# Patient Record
Sex: MALE | Race: WHITE | NOT HISPANIC OR LATINO
[De-identification: names, ages, dates, MRNs, and addresses within clinical notes are randomized per-mention and may not be internally consistent; named-entity substitution may affect disease eponyms.]

---

## 2017-08-20 ENCOUNTER — TRANSCRIPTION ENCOUNTER (OUTPATIENT)
Age: 47
End: 2017-08-20

## 2018-03-02 ENCOUNTER — MOBILE ON CALL (OUTPATIENT)
Age: 48
End: 2018-03-02

## 2018-03-13 ENCOUNTER — OUTPATIENT (OUTPATIENT)
Dept: OUTPATIENT SERVICES | Facility: HOSPITAL | Age: 48
LOS: 1 days | Discharge: HOME | End: 2018-03-13

## 2018-03-13 DIAGNOSIS — C61 MALIGNANT NEOPLASM OF PROSTATE: ICD-10-CM

## 2018-03-15 ENCOUNTER — APPOINTMENT (OUTPATIENT)
Dept: HEMATOLOGY ONCOLOGY | Facility: CLINIC | Age: 48
End: 2018-03-15

## 2018-07-12 ENCOUNTER — EMERGENCY (EMERGENCY)
Facility: HOSPITAL | Age: 48
LOS: 1 days | Discharge: HOME | End: 2018-07-12
Attending: EMERGENCY MEDICINE
Payer: COMMERCIAL

## 2018-07-12 VITALS — HEIGHT: 71 IN | WEIGHT: 289.91 LBS

## 2018-07-12 LAB
ALBUMIN SERPL ELPH-MCNC: 4.1 G/DL — SIGNIFICANT CHANGE UP (ref 3.3–5.2)
ALP SERPL-CCNC: 62 U/L — SIGNIFICANT CHANGE UP (ref 40–120)
ALT FLD-CCNC: 32 U/L — SIGNIFICANT CHANGE UP
ANION GAP SERPL CALC-SCNC: 13 MMOL/L — SIGNIFICANT CHANGE UP (ref 5–17)
AST SERPL-CCNC: 18 U/L — SIGNIFICANT CHANGE UP
BASOPHILS # BLD AUTO: 0 K/UL — SIGNIFICANT CHANGE UP (ref 0–0.2)
BASOPHILS NFR BLD AUTO: 0.3 % — SIGNIFICANT CHANGE UP (ref 0–2)
BILIRUB SERPL-MCNC: 0.3 MG/DL — LOW (ref 0.4–2)
BUN SERPL-MCNC: 23 MG/DL — HIGH (ref 8–20)
CALCIUM SERPL-MCNC: 9 MG/DL — SIGNIFICANT CHANGE UP (ref 8.6–10.2)
CHLORIDE SERPL-SCNC: 100 MMOL/L — SIGNIFICANT CHANGE UP (ref 98–107)
CO2 SERPL-SCNC: 24 MMOL/L — SIGNIFICANT CHANGE UP (ref 22–29)
CREAT SERPL-MCNC: 0.78 MG/DL — SIGNIFICANT CHANGE UP (ref 0.5–1.3)
EOSINOPHIL # BLD AUTO: 0.2 K/UL — SIGNIFICANT CHANGE UP (ref 0–0.5)
EOSINOPHIL NFR BLD AUTO: 2.5 % — SIGNIFICANT CHANGE UP (ref 0–6)
GLUCOSE SERPL-MCNC: 106 MG/DL — SIGNIFICANT CHANGE UP (ref 70–115)
HCT VFR BLD CALC: 39 % — LOW (ref 42–52)
HGB BLD-MCNC: 13.2 G/DL — LOW (ref 14–18)
INR BLD: 1.04 RATIO — SIGNIFICANT CHANGE UP (ref 0.88–1.16)
LYMPHOCYTES # BLD AUTO: 1.3 K/UL — SIGNIFICANT CHANGE UP (ref 1–4.8)
LYMPHOCYTES # BLD AUTO: 20.8 % — SIGNIFICANT CHANGE UP (ref 20–55)
MCHC RBC-ENTMCNC: 29.8 PG — SIGNIFICANT CHANGE UP (ref 27–31)
MCHC RBC-ENTMCNC: 33.8 G/DL — SIGNIFICANT CHANGE UP (ref 32–36)
MCV RBC AUTO: 88 FL — SIGNIFICANT CHANGE UP (ref 80–94)
MONOCYTES # BLD AUTO: 0.7 K/UL — SIGNIFICANT CHANGE UP (ref 0–0.8)
MONOCYTES NFR BLD AUTO: 11.6 % — HIGH (ref 3–10)
NEUTROPHILS # BLD AUTO: 4.1 K/UL — SIGNIFICANT CHANGE UP (ref 1.8–8)
NEUTROPHILS NFR BLD AUTO: 64.3 % — SIGNIFICANT CHANGE UP (ref 37–73)
NT-PROBNP SERPL-SCNC: 50 PG/ML — SIGNIFICANT CHANGE UP (ref 0–300)
PLATELET # BLD AUTO: 170 K/UL — SIGNIFICANT CHANGE UP (ref 150–400)
POTASSIUM SERPL-MCNC: 4 MMOL/L — SIGNIFICANT CHANGE UP (ref 3.5–5.3)
POTASSIUM SERPL-SCNC: 4 MMOL/L — SIGNIFICANT CHANGE UP (ref 3.5–5.3)
PROT SERPL-MCNC: 7 G/DL — SIGNIFICANT CHANGE UP (ref 6.6–8.7)
PROTHROM AB SERPL-ACNC: 11.4 SEC — SIGNIFICANT CHANGE UP (ref 9.8–12.7)
RBC # BLD: 4.43 M/UL — LOW (ref 4.6–6.2)
RBC # FLD: 13.9 % — SIGNIFICANT CHANGE UP (ref 11–15.6)
SODIUM SERPL-SCNC: 137 MMOL/L — SIGNIFICANT CHANGE UP (ref 135–145)
TROPONIN T SERPL-MCNC: <0.01 NG/ML — SIGNIFICANT CHANGE UP (ref 0–0.06)
TROPONIN T SERPL-MCNC: <0.01 NG/ML — SIGNIFICANT CHANGE UP (ref 0–0.06)
WBC # BLD: 6.4 K/UL — SIGNIFICANT CHANGE UP (ref 4.8–10.8)
WBC # FLD AUTO: 6.4 K/UL — SIGNIFICANT CHANGE UP (ref 4.8–10.8)

## 2018-07-12 PROCEDURE — 93010 ELECTROCARDIOGRAM REPORT: CPT

## 2018-07-12 PROCEDURE — 71046 X-RAY EXAM CHEST 2 VIEWS: CPT | Mod: 26

## 2018-07-12 PROCEDURE — 99220: CPT

## 2018-07-12 PROCEDURE — 71275 CT ANGIOGRAPHY CHEST: CPT | Mod: 26

## 2018-07-12 PROCEDURE — 99285 EMERGENCY DEPT VISIT HI MDM: CPT

## 2018-07-12 RX ORDER — SODIUM CHLORIDE 9 MG/ML
1000 INJECTION INTRAMUSCULAR; INTRAVENOUS; SUBCUTANEOUS ONCE
Qty: 0 | Refills: 0 | Status: COMPLETED | OUTPATIENT
Start: 2018-07-12 | End: 2018-07-12

## 2018-07-12 RX ORDER — ASPIRIN/CALCIUM CARB/MAGNESIUM 324 MG
325 TABLET ORAL ONCE
Qty: 0 | Refills: 0 | Status: COMPLETED | OUTPATIENT
Start: 2018-07-12 | End: 2018-07-12

## 2018-07-12 RX ORDER — METOPROLOL TARTRATE 50 MG
50 TABLET ORAL ONCE
Qty: 0 | Refills: 0 | Status: COMPLETED | OUTPATIENT
Start: 2018-07-12 | End: 2018-07-12

## 2018-07-12 RX ADMIN — SODIUM CHLORIDE 1000 MILLILITER(S): 9 INJECTION INTRAMUSCULAR; INTRAVENOUS; SUBCUTANEOUS at 13:50

## 2018-07-12 RX ADMIN — Medication 50 MILLIGRAM(S): at 15:55

## 2018-07-12 RX ADMIN — Medication 325 MILLIGRAM(S): at 13:50

## 2018-07-12 NOTE — CONSULT NOTE ADULT - ASSESSMENT
48 M smoker with hx metastatic prostate Ca presented with c/o CP started last night  around 6 pm continued throughout the night, CP is across the check, felt like tightness has been going on until today in the morning although now resolved, associated with SOB, denied modifying factors, in context of non exertion, moderate intensity, non radiating. Denied fever, chills, nausea, vomiting, hematuria,  melena, syncope, near syncope.       A/P  CP  EKG Normal  Trop #1 neg  AGree with CTA of Chest  Serial CE  Repeat EKG  ASA  NST   d/w pt. He understood well.  d/w ER team.

## 2018-07-12 NOTE — ED PROVIDER NOTE - PROGRESS NOTE DETAILS
discussed with cardio Dr Card for consult. Pt seen by Cardio Dr Card, wants patient admitted to obs for stress test in the morning.  discussed all results and CT scan with with patient and wife, understands and agrees with plan.

## 2018-07-12 NOTE — ED ADULT NURSE REASSESSMENT NOTE - NS ED NURSE REASSESS COMMENT FT1
resting comfortably, nsr on monitor, deciding weather he is willing to stay for stress in am, discussing with family, importance of test stressed to pt and family

## 2018-07-12 NOTE — CONSULT NOTE ADULT - SUBJECTIVE AND OBJECTIVE BOX
CARDIOLOGY CONSULTATION NOTE   Consult requested by:      Reason for Consultation:     History obtained by: Patient, family and medical record     obtained: No    Chief complaint:    Patient is a 48y old  Male who presents with a chief complaint of     HPI:  48 M smoker with hx metastatic prostate Ca presented with c/o CP started last night  around 6 pm continued throughout the night, CP is across the check, felt like tightness has been going on until today in the morning although now resolved, associated with SOB, denied modifying factors, in context of non exertion, moderate intensity, non radiating. Denied fever, chills, nausea, vomiting, hematuria,  melena, syncope, near syncope.       REVIEW OF SYMPTOMS:   Constitutional: Denied: fever, chills, weight loss or gain  Eyes: Denied: reddened ayes, eye discharge, eye pain  ENMT: Denied: ear pain, nasal discharge, mouth pain, throat pain or swelling  Cardiovascular: Denied:  palpitation, arrhythmia, irregular or fast heart beats, edema  Respiratory: Denied: cough, phlegm production, wheezes, orthopnea, PND,   GI: Denied: Abdominal pain, nausea, vomiting, diarrhea, constipation, melena, rectal bleed  : Denied: hematuria, frequency  Musculoskeletal: Denied: Muscle aches, weakness, pain  Hematology/lymp: Denied: Bleeding disorder, anemia, blood clotting  Neuro: Denied: Headache, light headedness, dizziness, numbness, aphasia, dysarthria, seizure,  syncope, near syncope  Psych: Denied: depression, anxiety  Integumentary/skin: Denied: rash, bruises, ecchymosis, itching  Allergy/Immunology: denied environmental or drug allergies    ALL OTHER REVIEW OF SYSTEMS ARE NEGATIVE.    MEDICATIONS  (STANDING):    MEDICATIONS  (PRN):        PAST MEDICAL & SURGICAL HISTORY:  Prostate cancer: in process of eval for mets to lungs and liver  Adenocarcinoma  No significant past surgical history      FAMILY HISTORY:  No pertinent family history in first degree relatives      SOCIAL HISTORY:    CIGARETTES:  No    ALCOHOL: No    DRUGS: No    Vital Signs Last 24 Hrs  T(C): 37 (12 Jul 2018 12:12), Max: 37 (12 Jul 2018 12:12)  T(F): 98.6 (12 Jul 2018 12:12), Max: 98.6 (12 Jul 2018 12:12)  HR: 68 (12 Jul 2018 15:56) (66 - 68)  BP: 142/82 (12 Jul 2018 15:56) (142/79 - 142/82)  BP(mean): --  RR: 20 (12 Jul 2018 15:56) (18 - 20)  SpO2: 97% (12 Jul 2018 15:56) (96% - 97%)    PHYSICAL EXAM:      Constitutional: No fever, chills, NAD, Comfortable    Eyes: Not reddened, no discharge    ENMT: No discharge, No pain    Neck: No JVD, No Bruit    Back: No CVA tenderness    Respiratory: Clear to auscultation bilaterally    Cardiovascular: RRR, Normal S1-2, No S3-, No friction rub murmur    Gastrointestinal: Soft, NT/ND. BS+, No organomegaly    Extremities: No edema    Vascular: Distal pulses intact    Neurological: Alert, awake, oriented, able to move extremities, speach is clear    Skin: No ecchymosis, no rash    Musculoskeletal: Non tender, no weaknss    Psychiatric: Aproppriate mood, no anxiety        INTERPRETATION OF TELEMETRY:    ECG: NSR, Normal EKG    I&O's Detail      LABS:                        13.2   6.4   )-----------( 170      ( 12 Jul 2018 13:44 )             39.0     07-12    137  |  100  |  23.0<H>  ----------------------------<  106  4.0   |  24.0  |  0.78    Ca    9.0      12 Jul 2018 13:44    TPro  7.0  /  Alb  4.1  /  TBili  0.3<L>  /  DBili  x   /  AST  18  /  ALT  32  /  AlkPhos  62  07-12    CARDIAC MARKERS ( 12 Jul 2018 13:44 )  x     / <0.01 ng/mL / x     / x     / x          PT/INR - ( 12 Jul 2018 13:44 )   PT: 11.4 sec;   INR: 1.04 ratio             I

## 2018-07-12 NOTE — ED PROVIDER NOTE - OBJECTIVE STATEMENT
Patient started with Chest pain last night around 6pm, chest pain continued throughout the night, is very faint now.  no fever, no nausea, vomiting, positive sweating, no radiation of pain.  felt as a tightness and shortness of breath, no similar events. no trauma. patient stopped smoking in March 2018. patient was diagnosed with Prostate cancer stage IV in march 2018.  currently on no chemo or radiation but is on hormone therapy. received a infusion yesterday for thin bones and thinks it may be the cause of the symptoms. he received that in the morning yesterday.  no hx of stents, MI, PE or clots. no recent illnesses.  norhting made it better or worse. patient did not take any aspirin.

## 2018-07-12 NOTE — ED CDU PROVIDER INITIAL DAY NOTE - OBJECTIVE STATEMENT
Patient with Chest pain since last night, negative CT angio for PE, no hx of cardiac eval, recently quit smoking, positive active metastatic lung Ca.

## 2018-07-12 NOTE — ED PROVIDER NOTE - MEDICAL DECISION MAKING DETAILS
Patient with negative troponin and EKG, negative ct angio, seen by Cardio, wants admit to observation to have stress test in the morning.

## 2018-07-13 VITALS
DIASTOLIC BLOOD PRESSURE: 68 MMHG | HEART RATE: 71 BPM | RESPIRATION RATE: 16 BRPM | TEMPERATURE: 98 F | OXYGEN SATURATION: 99 % | SYSTOLIC BLOOD PRESSURE: 145 MMHG

## 2018-07-13 PROCEDURE — 78452 HT MUSCLE IMAGE SPECT MULT: CPT

## 2018-07-13 PROCEDURE — 84484 ASSAY OF TROPONIN QUANT: CPT

## 2018-07-13 PROCEDURE — 93018 CV STRESS TEST I&R ONLY: CPT

## 2018-07-13 PROCEDURE — 85027 COMPLETE CBC AUTOMATED: CPT

## 2018-07-13 PROCEDURE — 83880 ASSAY OF NATRIURETIC PEPTIDE: CPT

## 2018-07-13 PROCEDURE — 99284 EMERGENCY DEPT VISIT MOD MDM: CPT | Mod: 25

## 2018-07-13 PROCEDURE — 71046 X-RAY EXAM CHEST 2 VIEWS: CPT

## 2018-07-13 PROCEDURE — 93005 ELECTROCARDIOGRAM TRACING: CPT

## 2018-07-13 PROCEDURE — 93017 CV STRESS TEST TRACING ONLY: CPT

## 2018-07-13 PROCEDURE — 78452 HT MUSCLE IMAGE SPECT MULT: CPT | Mod: 26

## 2018-07-13 PROCEDURE — 99217: CPT

## 2018-07-13 PROCEDURE — 80053 COMPREHEN METABOLIC PANEL: CPT

## 2018-07-13 PROCEDURE — 36415 COLL VENOUS BLD VENIPUNCTURE: CPT

## 2018-07-13 PROCEDURE — G0378: CPT

## 2018-07-13 PROCEDURE — 93016 CV STRESS TEST SUPVJ ONLY: CPT

## 2018-07-13 PROCEDURE — A9500: CPT

## 2018-07-13 PROCEDURE — 85610 PROTHROMBIN TIME: CPT

## 2018-07-13 PROCEDURE — 71275 CT ANGIOGRAPHY CHEST: CPT

## 2018-07-13 NOTE — ED ADULT NURSE REASSESSMENT NOTE - NS ED NURSE REASSESS COMMENT FT1
Patient Back from Nuclear test. Pt Patient Back from Nuclear test. Pt A&OX3, Denies any pain or discomfort at this time. VSS.

## 2019-03-03 ENCOUNTER — EMERGENCY (EMERGENCY)
Facility: HOSPITAL | Age: 49
LOS: 1 days | Discharge: HOME | End: 2019-03-03
Attending: EMERGENCY MEDICINE
Payer: COMMERCIAL

## 2019-03-03 VITALS
TEMPERATURE: 100 F | RESPIRATION RATE: 18 BRPM | DIASTOLIC BLOOD PRESSURE: 70 MMHG | HEART RATE: 72 BPM | WEIGHT: 300.05 LBS | HEIGHT: 71 IN | SYSTOLIC BLOOD PRESSURE: 106 MMHG | OXYGEN SATURATION: 95 %

## 2019-03-03 PROBLEM — C80.1 MALIGNANT (PRIMARY) NEOPLASM, UNSPECIFIED: Chronic | Status: ACTIVE | Noted: 2018-07-12

## 2019-03-03 PROBLEM — C61 MALIGNANT NEOPLASM OF PROSTATE: Chronic | Status: ACTIVE | Noted: 2018-07-12

## 2019-03-03 LAB
ALBUMIN SERPL ELPH-MCNC: 4.2 G/DL — SIGNIFICANT CHANGE UP (ref 3.3–5.2)
ALP SERPL-CCNC: 76 U/L — SIGNIFICANT CHANGE UP (ref 40–120)
ALT FLD-CCNC: 27 U/L — SIGNIFICANT CHANGE UP
ANION GAP SERPL CALC-SCNC: 13 MMOL/L — SIGNIFICANT CHANGE UP (ref 5–17)
APPEARANCE UR: ABNORMAL
AST SERPL-CCNC: 14 U/L — SIGNIFICANT CHANGE UP
BILIRUB SERPL-MCNC: 0.4 MG/DL — SIGNIFICANT CHANGE UP (ref 0.4–2)
BILIRUB UR-MCNC: NEGATIVE — SIGNIFICANT CHANGE UP
BUN SERPL-MCNC: 22 MG/DL — HIGH (ref 8–20)
CALCIUM SERPL-MCNC: 9.2 MG/DL — SIGNIFICANT CHANGE UP (ref 8.6–10.2)
CHLORIDE SERPL-SCNC: 102 MMOL/L — SIGNIFICANT CHANGE UP (ref 98–107)
CO2 SERPL-SCNC: 20 MMOL/L — LOW (ref 22–29)
COLOR SPEC: YELLOW — SIGNIFICANT CHANGE UP
CREAT SERPL-MCNC: 0.64 MG/DL — SIGNIFICANT CHANGE UP (ref 0.5–1.3)
DIFF PNL FLD: ABNORMAL
EPI CELLS # UR: SIGNIFICANT CHANGE UP
GLUCOSE SERPL-MCNC: 102 MG/DL — SIGNIFICANT CHANGE UP (ref 70–115)
GLUCOSE UR QL: NEGATIVE MG/DL — SIGNIFICANT CHANGE UP
HCT VFR BLD CALC: 39.5 % — LOW (ref 42–52)
HGB BLD-MCNC: 13.5 G/DL — LOW (ref 14–18)
KETONES UR-MCNC: ABNORMAL
LACTATE BLDV-MCNC: 1 MMOL/L — SIGNIFICANT CHANGE UP (ref 0.5–2)
LEUKOCYTE ESTERASE UR-ACNC: ABNORMAL
LIDOCAIN IGE QN: 23 U/L — SIGNIFICANT CHANGE UP (ref 22–51)
LYMPHOCYTES # BLD AUTO: 5 % — LOW (ref 20–55)
MCHC RBC-ENTMCNC: 29.3 PG — SIGNIFICANT CHANGE UP (ref 27–31)
MCHC RBC-ENTMCNC: 34.2 G/DL — SIGNIFICANT CHANGE UP (ref 32–36)
MCV RBC AUTO: 85.9 FL — SIGNIFICANT CHANGE UP (ref 80–94)
MONOCYTES NFR BLD AUTO: 3 % — SIGNIFICANT CHANGE UP (ref 3–10)
NEUTROPHILS NFR BLD AUTO: 92 % — HIGH (ref 37–73)
NITRITE UR-MCNC: NEGATIVE — SIGNIFICANT CHANGE UP
PH UR: 5 — SIGNIFICANT CHANGE UP (ref 5–8)
PLAT MORPH BLD: NORMAL — SIGNIFICANT CHANGE UP
PLATELET # BLD AUTO: 132 K/UL — LOW (ref 150–400)
POTASSIUM SERPL-MCNC: 4.2 MMOL/L — SIGNIFICANT CHANGE UP (ref 3.5–5.3)
POTASSIUM SERPL-SCNC: 4.2 MMOL/L — SIGNIFICANT CHANGE UP (ref 3.5–5.3)
PROT SERPL-MCNC: 7.2 G/DL — SIGNIFICANT CHANGE UP (ref 6.6–8.7)
PROT UR-MCNC: 30 MG/DL
RAPID RVP RESULT: SIGNIFICANT CHANGE UP
RBC # BLD: 4.6 M/UL — SIGNIFICANT CHANGE UP (ref 4.6–6.2)
RBC # FLD: 14.1 % — SIGNIFICANT CHANGE UP (ref 11–15.6)
RBC BLD AUTO: NORMAL — SIGNIFICANT CHANGE UP
RBC CASTS # UR COMP ASSIST: ABNORMAL /HPF (ref 0–4)
SODIUM SERPL-SCNC: 135 MMOL/L — SIGNIFICANT CHANGE UP (ref 135–145)
SP GR SPEC: 1.02 — SIGNIFICANT CHANGE UP (ref 1.01–1.02)
TROPONIN T SERPL-MCNC: <0.01 NG/ML — SIGNIFICANT CHANGE UP (ref 0–0.06)
UROBILINOGEN FLD QL: 1 MG/DL
WBC # BLD: 6 K/UL — SIGNIFICANT CHANGE UP (ref 4.8–10.8)
WBC # FLD AUTO: 6 K/UL — SIGNIFICANT CHANGE UP (ref 4.8–10.8)
WBC UR QL: SIGNIFICANT CHANGE UP

## 2019-03-03 PROCEDURE — 93010 ELECTROCARDIOGRAM REPORT: CPT

## 2019-03-03 PROCEDURE — 99218: CPT

## 2019-03-03 PROCEDURE — 73010 X-RAY EXAM OF SHOULDER BLADE: CPT | Mod: 26

## 2019-03-03 PROCEDURE — 71046 X-RAY EXAM CHEST 2 VIEWS: CPT | Mod: 26

## 2019-03-03 RX ORDER — SODIUM CHLORIDE 9 MG/ML
2000 INJECTION INTRAMUSCULAR; INTRAVENOUS; SUBCUTANEOUS
Qty: 0 | Refills: 0 | Status: DISCONTINUED | OUTPATIENT
Start: 2019-03-03 | End: 2019-03-08

## 2019-03-03 RX ORDER — AZITHROMYCIN 500 MG/1
500 TABLET, FILM COATED ORAL EVERY 24 HOURS
Qty: 0 | Refills: 0 | Status: DISCONTINUED | OUTPATIENT
Start: 2019-03-03 | End: 2019-03-08

## 2019-03-03 RX ORDER — HYDROCORTISONE 20 MG
15 TABLET ORAL
Qty: 0 | Refills: 0 | Status: DISCONTINUED | OUTPATIENT
Start: 2019-03-03 | End: 2019-03-08

## 2019-03-03 RX ORDER — ACETAMINOPHEN 500 MG
650 TABLET ORAL EVERY 6 HOURS
Qty: 0 | Refills: 0 | Status: DISCONTINUED | OUTPATIENT
Start: 2019-03-03 | End: 2019-03-08

## 2019-03-03 RX ORDER — CEFTRIAXONE 500 MG/1
1 INJECTION, POWDER, FOR SOLUTION INTRAMUSCULAR; INTRAVENOUS EVERY 24 HOURS
Qty: 0 | Refills: 0 | Status: DISCONTINUED | OUTPATIENT
Start: 2019-03-03 | End: 2019-03-08

## 2019-03-03 RX ORDER — ABIRATERONE ACETATE 250 MG/1
4 TABLET ORAL
Qty: 0 | Refills: 0 | COMMUNITY

## 2019-03-03 RX ORDER — PREDNISOLONE 5 MG
1 TABLET ORAL
Qty: 0 | Refills: 0 | COMMUNITY

## 2019-03-03 RX ADMIN — Medication 650 MILLIGRAM(S): at 22:11

## 2019-03-03 RX ADMIN — AZITHROMYCIN 500 MILLIGRAM(S): 500 TABLET, FILM COATED ORAL at 18:48

## 2019-03-03 RX ADMIN — SODIUM CHLORIDE 1000 MILLILITER(S): 9 INJECTION INTRAMUSCULAR; INTRAVENOUS; SUBCUTANEOUS at 15:50

## 2019-03-03 RX ADMIN — Medication 10 MILLIGRAM(S): at 18:49

## 2019-03-03 RX ADMIN — CEFTRIAXONE 100 GRAM(S): 500 INJECTION, POWDER, FOR SOLUTION INTRAMUSCULAR; INTRAVENOUS at 18:49

## 2019-03-03 RX ADMIN — Medication 650 MILLIGRAM(S): at 20:36

## 2019-03-03 RX ADMIN — SODIUM CHLORIDE 2000 MILLILITER(S): 9 INJECTION INTRAMUSCULAR; INTRAVENOUS; SUBCUTANEOUS at 18:59

## 2019-03-03 RX ADMIN — CEFTRIAXONE 1 GRAM(S): 500 INJECTION, POWDER, FOR SOLUTION INTRAMUSCULAR; INTRAVENOUS at 19:34

## 2019-03-03 RX ADMIN — Medication 15 MILLIGRAM(S): at 22:08

## 2019-03-03 NOTE — ED STATDOCS - ATTENDING CONTRIBUTION TO CARE
I, Aman Hines, performed the initial face to face bedside interview with this patient regarding history of present illness, review of symptoms and relevant past medical, social and family history.  I completed an independent physical examination.  I was the initial provider who evaluated this patient. I have signed out the follow up of any pending tests (i.e. labs, radiological studies) to the ACP.  I have communicated the patient’s plan of care and disposition with the ACP.  The history, relevant review of systems, past medical and surgical history, medical decision making, and physical examination was documented by the scribe in my presence and I attest to the accuracy of the documentation.

## 2019-03-03 NOTE — ED ADULT NURSE NOTE - CAS EDN DISCHARGE ASSESSMENT
Alert and oriented to person, place and time/Patient baseline mental status/Awake Symptoms improved/Alert and oriented to person, place and time/Patient baseline mental status/Awake

## 2019-03-03 NOTE — ED ADULT NURSE NOTE - NSSISCREENINGQ5_ED_A_ED
Discharge Information    IV Discontiued Time:  NA    Amount of Fluid Infused:  NA    Discharge Criteria = When patient returns to baseline or as per MD order    Consciousness:  Pt is fully awake    Circulation:  BP +/- 20% of pre-procedure level    Respiration:  Patient is able to breathe deeply    O2 Sat:  Patient is able to maintain O2 Sat >92% on room air    Activity:  Moves 4 extremities on command    Ambulation:  Patient is able to stand and walk or stand and pivot into wheelchair    Dressing:  Clean/dry or No Dressing    Notes:   Discharge instructions and AVS given to patient    Patient meets criteria for discharge?  YES    Admitted to PCU?  No    Responsible adult present to accompany patient home?  Yes    Signature/Title:    Leti Carrillo RN Care Coordinator  [facility] [place]   No

## 2019-03-03 NOTE — ED STATDOCS - OBJECTIVE STATEMENT
49 y/o M pt with PMHx of Adenocarcinoma and Prostate cancer presents to the ED c/o fever onset this morning. Reports fever, nausea, vomiting, diarrhea, body aches, and lethargy. Pt also reported L shoulder/upper back pain and JESSICA leg and hand pain onset a few days. He spoke to his doctor who sent him to the ED for further evaluation. Pt is currently receiving radiation treatment for his Prostate CA and is on his third week of treatment. He took Tylenol at 11am this morning which provided minimal to no relief. Pt is able to ambulate with no difficulties. Denies chills, cough, or rhinorrhea. No further complaints at this time.

## 2019-03-03 NOTE — ED CDU PROVIDER INITIAL DAY NOTE - OBJECTIVE STATEMENT
47 y/o male h/o prostate cancer undergoing radiation therapy presents c/o body aches, vomiting, and diarrhea. PT reports occasionally in the morning however denies any productive cough. 49 y/o male h/o prostate cancer undergoing radiation therapy presents c/o body aches, vomiting, and diarrhea. PT reports cough occasionally in the morning however denies any productive cough or URI symptoms.

## 2019-03-03 NOTE — ED CDU PROVIDER INITIAL DAY NOTE - PROGRESS NOTE DETAILS
Oncologist Dr. Oliver [phone]  current meds: prednisone 10mg PO qd, Abiraterone Acetate 1000mg PO qd Discussed with Dr. Oliver who agrees with Rocephin and Zithromax. Will add on tamiflu if flu positive.   I spoke with pharmacy and Abiraterone Acetate not available in hospital. Family will bring in home meds. Discussed with Dr. Oliver who agrees with Rocephin and Zithromax. Will add on tamiflu if flu positive.   I spoke with pharmacy and Abiraterone Acetate not available in hospital. Family will bring in home meds. Oncologist advised PT is on prednisone 5mg bid. PT seen resting comfortably in no acute distress, no acute complaints at this time, PT tolerating PO intake,  PT informed of all results, and tx plan.   ROS: PT denies fever, chills, wekaness, HA,  dizziness,  ear pian, throat pain, CP, cough,  SOB, diff breathing, Abd pain, change in urination or BM, rash or recent exposure to illness.   PE: GEN: Awake, alert,  NAD,  EYES: PERRL CARDIAC: Reg rate and rhythm, S1,S2, RRR  RESP: No distress noted. Lungs CTA bilaterally no wheeze, ronchi, rales. ABD: soft,  non-tender, no guarding. . NEURO: AOx3, no focal deficits sensation strenght intact.   PLAN: continue with IV ABX, give home chemo drug as per private Oncologist, and revaluate. PT with abnormal VS but continues to be clinically stable Oncologist Dr. Oliver contacted recommended steroid stress dosing due to pt being on crocinic steroid use. dose acquired from [facility] as 12mg/m2/daily divided in 2 doses. Will tx with steroids is VS continue to be abnormal will likely admit.

## 2019-03-03 NOTE — ED CDU PROVIDER INITIAL DAY NOTE - ATTENDING CONTRIBUTION TO CARE
Pt. stable appearing. Pt. with pneumonia. Will treat with IV ABX. I have discussed the plan with the ACP.

## 2019-03-03 NOTE — ED STATDOCS - PROGRESS NOTE DETAILS
PA Note: PT evaluated by intake physician. HPI/PE/ROS as noted above. Will follow up plan per intake physician. CXR showing left lower lobe pneumonia. Discussed with hospitalist Dr. Vega, pt does not meet admission criteria at this time as labwork is wnl, pt is not hypoxic, is not necessarily immunocompromised with radiation treatment. Pt to be admitted to observation for IV antibiotics and to monitor.

## 2019-03-03 NOTE — ED ADULT NURSE NOTE - OBJECTIVE STATEMENT
pt currently going through course of radiation, experiencing body aches, hands, shoulders, hips, legs. feeling generalized weakness. last treatment friday. denies fevers.

## 2019-03-03 NOTE — ED CDU PROVIDER INITIAL DAY NOTE - MEDICAL DECISION MAKING DETAILS
47 y/o male h/o prostate CA presents with flu like symptoms with let lower lobe pneumonia   will admit to obs for IV abx and re-eval, flu swab is pending

## 2019-03-03 NOTE — ED STATDOCS - CHPI ED SYMPTOM POS
body aches, lethargy, L shoulder/upper back pain and JESSICA leg and hand pain/FEVER/NAUSEA/DIARRHEA/VOMITING

## 2019-03-04 VITALS
RESPIRATION RATE: 16 BRPM | DIASTOLIC BLOOD PRESSURE: 79 MMHG | OXYGEN SATURATION: 96 % | HEART RATE: 58 BPM | TEMPERATURE: 98 F | SYSTOLIC BLOOD PRESSURE: 133 MMHG

## 2019-03-04 LAB
CULTURE RESULTS: NO GROWTH — SIGNIFICANT CHANGE UP
SPECIMEN SOURCE: SIGNIFICANT CHANGE UP

## 2019-03-04 PROCEDURE — 83690 ASSAY OF LIPASE: CPT

## 2019-03-04 PROCEDURE — 81001 URINALYSIS AUTO W/SCOPE: CPT

## 2019-03-04 PROCEDURE — 87486 CHLMYD PNEUM DNA AMP PROBE: CPT

## 2019-03-04 PROCEDURE — 80053 COMPREHEN METABOLIC PANEL: CPT

## 2019-03-04 PROCEDURE — 99284 EMERGENCY DEPT VISIT MOD MDM: CPT | Mod: 25

## 2019-03-04 PROCEDURE — 87186 SC STD MICRODIL/AGAR DIL: CPT

## 2019-03-04 PROCEDURE — 71046 X-RAY EXAM CHEST 2 VIEWS: CPT

## 2019-03-04 PROCEDURE — 84484 ASSAY OF TROPONIN QUANT: CPT

## 2019-03-04 PROCEDURE — 99217: CPT

## 2019-03-04 PROCEDURE — 87040 BLOOD CULTURE FOR BACTERIA: CPT

## 2019-03-04 PROCEDURE — 73010 X-RAY EXAM OF SHOULDER BLADE: CPT

## 2019-03-04 PROCEDURE — 36415 COLL VENOUS BLD VENIPUNCTURE: CPT

## 2019-03-04 PROCEDURE — 96361 HYDRATE IV INFUSION ADD-ON: CPT

## 2019-03-04 PROCEDURE — 87581 M.PNEUMON DNA AMP PROBE: CPT

## 2019-03-04 PROCEDURE — 96365 THER/PROPH/DIAG IV INF INIT: CPT

## 2019-03-04 PROCEDURE — 87633 RESP VIRUS 12-25 TARGETS: CPT

## 2019-03-04 PROCEDURE — 85027 COMPLETE CBC AUTOMATED: CPT

## 2019-03-04 PROCEDURE — 93005 ELECTROCARDIOGRAM TRACING: CPT

## 2019-03-04 PROCEDURE — 87150 DNA/RNA AMPLIFIED PROBE: CPT

## 2019-03-04 PROCEDURE — 83605 ASSAY OF LACTIC ACID: CPT

## 2019-03-04 PROCEDURE — G0378: CPT

## 2019-03-04 PROCEDURE — 87798 DETECT AGENT NOS DNA AMP: CPT

## 2019-03-04 PROCEDURE — 87086 URINE CULTURE/COLONY COUNT: CPT

## 2019-03-04 RX ORDER — AZITHROMYCIN 500 MG/1
1 TABLET, FILM COATED ORAL
Qty: 4 | Refills: 0 | OUTPATIENT
Start: 2019-03-04 | End: 2019-03-07

## 2019-03-04 RX ORDER — SODIUM CHLORIDE 9 MG/ML
1000 INJECTION INTRAMUSCULAR; INTRAVENOUS; SUBCUTANEOUS
Qty: 0 | Refills: 0 | Status: DISCONTINUED | OUTPATIENT
Start: 2019-03-04 | End: 2019-03-08

## 2019-03-04 RX ORDER — CEFPODOXIME PROXETIL 100 MG
1 TABLET ORAL
Qty: 10 | Refills: 0 | OUTPATIENT
Start: 2019-03-04 | End: 2019-03-08

## 2019-03-04 RX ADMIN — Medication 15 MILLIGRAM(S): at 05:46

## 2019-03-04 RX ADMIN — Medication 5 MILLIGRAM(S): at 05:46

## 2019-03-04 RX ADMIN — SODIUM CHLORIDE 175 MILLILITER(S): 9 INJECTION INTRAMUSCULAR; INTRAVENOUS; SUBCUTANEOUS at 06:43

## 2019-03-04 NOTE — ED CDU PROVIDER SUBSEQUENT DAY NOTE - NS ED MD PROGRESS NOTE PROVIDER NAME4 FT
Eliezer Spaulding PA-C Breath Sounds equal & clear to percussion & auscultation, no accessory muscle use

## 2019-03-04 NOTE — ED CDU PROVIDER SUBSEQUENT DAY NOTE - MEDICAL DECISION MAKING DETAILS
47 y/o male h/o prostate CA presents with flu like symptoms with let lower lobe pneumonia   admitted to CDU for IV abx and no acute issues over night will continue plan of care with DC home likely in the afternoon pending pt continues to have normal VS.

## 2019-03-04 NOTE — ED CDU PROVIDER SUBSEQUENT DAY NOTE - HISTORY
47 y/o male h/o prostate cancer undergoing radiation therapy presents c/o body aches, vomiting, and diarrhea. PT reports cough occasionally in the morning however denies any productive cough or URI symptoms. no scute complaints over night feeling better.

## 2019-03-04 NOTE — ED CDU PROVIDER DISPOSITION NOTE - NSFOLLOWUPINSTRUCTIONS_ED_ALL_ED_FT
Please follow up with your oncologist this week, MD URBINA.    Take antibiotics as RX    Return if symptoms worsen or persist.

## 2019-03-04 NOTE — ED CDU PROVIDER SUBSEQUENT DAY NOTE - CROS ED CONS ALL NEG
- - -
Franklin Moss), Cardiovascular Disease; Interventional Cardiology  [address] [place] [address]  Phone: [phone]  Fax: [phone]

## 2019-03-04 NOTE — ED ADULT NURSE REASSESSMENT NOTE - GENERAL PATIENT STATE
family/SO at bedside/cooperative
smiling/interactive/comfortable appearance/resting/sleeping
comfortable appearance/cooperative
smiling/interactive/cooperative/improvement verbalized/comfortable appearance

## 2019-03-04 NOTE — ED CDU PROVIDER DISPOSITION NOTE - CLINICAL COURSE
patient with PNA, immunocompromised with prostate CA on chemo and radiation, f/u with oncologist CIARA, tolerated PO in ED, feels well to go home, overnight spiked low grade temperature 100.4 TMAX and hypotension, responded to stress dose steroids will advise to increase prednisone from 5mg to 10mg BID and TX with Z-pack and Vantin (as per oncologist recommendations).

## 2019-03-04 NOTE — ED ADULT NURSE REASSESSMENT NOTE - COMFORT CARE
meal provided
plan of care explained/meal provided/warm blanket provided/po fluids offered/wait time explained
po fluids offered/wait time explained
plan of care explained
side rails up/darkened lights/plan of care explained/po fluids offered/wait time explained/ambulated to bathroom

## 2019-03-04 NOTE — ED CDU PROVIDER SUBSEQUENT DAY NOTE - PROGRESS NOTE DETAILS
Oncologist Dr. Oliver [phone]  current meds: prednisone 10mg PO qd, Abiraterone Acetate 1000mg PO qd Discussed with Dr. Oliver who agrees with Rocephin and Zithromax. Will add on tamiflu if flu positive.   I spoke with pharmacy and Abiraterone Acetate not available in hospital. Family will bring in home meds. Oncologist advised PT is on prednisone 5mg bid. PT seen resting comfortably in no acute distress, no acute complaints at this time, PT tolerating PO intake,  PT informed of all results, and tx plan.   ROS: PT denies fever, chills, wekaness, HA,  dizziness,  ear pian, throat pain, CP, cough,  SOB, diff breathing, Abd pain, change in urination or BM, rash or recent exposure to illness.   PE: GEN: Awake, alert,  NAD,  EYES: PERRL CARDIAC: Reg rate and rhythm, S1,S2, RRR  RESP: No distress noted. Lungs CTA bilaterally no wheeze, ronchi, rales. ABD: soft,  non-tender, no guarding. . NEURO: AOx3, no focal deficits sensation strenght intact.   PLAN: continue with IV ABX, give home chemo drug as per private Oncologist, and revaluate. PT with abnormal VS but continues to be clinically stable Oncologist Dr. Oliver contacted recommended steroid stress dosing due to pt being on crocinic steroid use. dose acquired from [facility] as 12mg/m2/daily divided in 2 doses. Will tx with steroids is VS continue to be abnormal will likely admit.

## 2019-03-04 NOTE — ED ADULT NURSE REASSESSMENT NOTE - NS ED NURSE REASSESS COMMENT FT1
verbal report rec'd from ED RN Nitza, assumed care of pt, no obvious distress noted. pt moved to CDU for observation
Pt resting in bed comfortably, vss, denies pain, will continue to monitor.
Pt "feeling better", vss , no sign of distress noted, meds given as ordered, no complaint voiced.
Received patient Aox4 , vss, no sign of distress noted, denies pain at this time, pt instructed to call for assistance, call bell w/i reach. Nursing care continues.
verbal report rec'd from CDU SARATH Fairbanks, assumed care of pt, no obvious distress noted

## 2019-03-04 NOTE — ED CDU PROVIDER DISPOSITION NOTE - ATTENDING CONTRIBUTION TO CARE
I personally saw the patient with the PA, and completed the key components of the history and physical exam. I then discussed the management plan with the PA.    Pt well appearing and wishing to go home this morning; pt denies any recurrent dizziness/ lightheadedness/ SOB/ back pain; Pt denies any recent hospitalizations for the past 3 weeks. Discussion had w/ pt regarding increasing baseline prednisone to 10mg BID as pt received stress dose of hydrocortisone here given transient hypotension. Pt says he has enough prednisone to increase his dose for several days; states he will f/u with his oncologist this week;  Pt advised as to return precautions in case of feeling worse.

## 2019-03-04 NOTE — ED ADULT NURSE REASSESSMENT NOTE - NSIMPLEMENTINTERV_GEN_ALL_ED
Implemented All Universal Safety Interventions:  [place] to call system. Call bell, personal items and telephone within reach. Instruct patient to call for assistance. Room bathroom lighting operational. Non-slip footwear when patient is off stretcher. Physically safe environment: no spills, clutter or unnecessary equipment. Stretcher in lowest position, wheels locked, appropriate side rails in place.

## 2019-03-05 ENCOUNTER — EMERGENCY (EMERGENCY)
Facility: HOSPITAL | Age: 49
LOS: 1 days | Discharge: HOME | End: 2019-03-05
Attending: EMERGENCY MEDICINE
Payer: COMMERCIAL

## 2019-03-05 VITALS
RESPIRATION RATE: 20 BRPM | OXYGEN SATURATION: 97 % | WEIGHT: 302.03 LBS | SYSTOLIC BLOOD PRESSURE: 153 MMHG | HEART RATE: 64 BPM | TEMPERATURE: 98 F | DIASTOLIC BLOOD PRESSURE: 92 MMHG | HEIGHT: 71 IN

## 2019-03-05 LAB
-  COAGULASE NEGATIVE STAPHYLOCOCCUS: SIGNIFICANT CHANGE UP
ALBUMIN SERPL ELPH-MCNC: 4.2 G/DL — SIGNIFICANT CHANGE UP (ref 3.3–5.2)
ALP SERPL-CCNC: 77 U/L — SIGNIFICANT CHANGE UP (ref 40–120)
ALT FLD-CCNC: 42 U/L — HIGH
ANION GAP SERPL CALC-SCNC: 12 MMOL/L — SIGNIFICANT CHANGE UP (ref 5–17)
APTT BLD: 28.4 SEC — SIGNIFICANT CHANGE UP (ref 27.5–36.3)
AST SERPL-CCNC: 26 U/L — SIGNIFICANT CHANGE UP
BILIRUB SERPL-MCNC: 0.2 MG/DL — LOW (ref 0.4–2)
BUN SERPL-MCNC: 14 MG/DL — SIGNIFICANT CHANGE UP (ref 8–20)
CALCIUM SERPL-MCNC: 8.8 MG/DL — SIGNIFICANT CHANGE UP (ref 8.6–10.2)
CHLORIDE SERPL-SCNC: 102 MMOL/L — SIGNIFICANT CHANGE UP (ref 98–107)
CO2 SERPL-SCNC: 24 MMOL/L — SIGNIFICANT CHANGE UP (ref 22–29)
CREAT SERPL-MCNC: 0.69 MG/DL — SIGNIFICANT CHANGE UP (ref 0.5–1.3)
EOSINOPHIL # BLD AUTO: 0.1 K/UL — SIGNIFICANT CHANGE UP (ref 0–0.5)
EOSINOPHIL NFR BLD AUTO: 1.5 % — SIGNIFICANT CHANGE UP (ref 0–5)
GLUCOSE SERPL-MCNC: 112 MG/DL — SIGNIFICANT CHANGE UP (ref 70–115)
HCT VFR BLD CALC: 36.6 % — LOW (ref 42–52)
HGB BLD-MCNC: 12.5 G/DL — LOW (ref 14–18)
INR BLD: 0.96 RATIO — SIGNIFICANT CHANGE UP (ref 0.88–1.16)
LACTATE BLDV-MCNC: 1 MMOL/L — SIGNIFICANT CHANGE UP (ref 0.5–2)
LYMPHOCYTES # BLD AUTO: 0.4 K/UL — LOW (ref 1–4.8)
LYMPHOCYTES # BLD AUTO: 9.1 % — LOW (ref 20–55)
MCHC RBC-ENTMCNC: 29.3 PG — SIGNIFICANT CHANGE UP (ref 27–31)
MCHC RBC-ENTMCNC: 34.2 G/DL — SIGNIFICANT CHANGE UP (ref 32–36)
MCV RBC AUTO: 85.7 FL — SIGNIFICANT CHANGE UP (ref 80–94)
METHOD TYPE: SIGNIFICANT CHANGE UP
MONOCYTES # BLD AUTO: 0.1 K/UL — SIGNIFICANT CHANGE UP (ref 0–0.8)
MONOCYTES NFR BLD AUTO: 3.4 % — SIGNIFICANT CHANGE UP (ref 3–10)
NEUTROPHILS # BLD AUTO: 3.5 K/UL — SIGNIFICANT CHANGE UP (ref 1.8–8)
NEUTROPHILS NFR BLD AUTO: 85.5 % — HIGH (ref 37–73)
PLATELET # BLD AUTO: 137 K/UL — LOW (ref 150–400)
POTASSIUM SERPL-MCNC: 4.2 MMOL/L — SIGNIFICANT CHANGE UP (ref 3.5–5.3)
POTASSIUM SERPL-SCNC: 4.2 MMOL/L — SIGNIFICANT CHANGE UP (ref 3.5–5.3)
PROCALCITONIN SERPL-MCNC: 0.2 NG/ML — HIGH (ref 0–0.04)
PROT SERPL-MCNC: 7.3 G/DL — SIGNIFICANT CHANGE UP (ref 6.6–8.7)
PROTHROM AB SERPL-ACNC: 11 SEC — SIGNIFICANT CHANGE UP (ref 10–12.9)
RBC # BLD: 4.27 M/UL — LOW (ref 4.6–6.2)
RBC # FLD: 14.1 % — SIGNIFICANT CHANGE UP (ref 11–15.6)
SODIUM SERPL-SCNC: 138 MMOL/L — SIGNIFICANT CHANGE UP (ref 135–145)
WBC # BLD: 4.1 K/UL — LOW (ref 4.8–10.8)
WBC # FLD AUTO: 4.1 K/UL — LOW (ref 4.8–10.8)

## 2019-03-05 PROCEDURE — 99283 EMERGENCY DEPT VISIT LOW MDM: CPT

## 2019-03-05 PROCEDURE — 99284 EMERGENCY DEPT VISIT MOD MDM: CPT

## 2019-03-05 PROCEDURE — 85027 COMPLETE CBC AUTOMATED: CPT

## 2019-03-05 PROCEDURE — 85610 PROTHROMBIN TIME: CPT

## 2019-03-05 PROCEDURE — 87040 BLOOD CULTURE FOR BACTERIA: CPT

## 2019-03-05 PROCEDURE — 84145 PROCALCITONIN (PCT): CPT

## 2019-03-05 PROCEDURE — 85730 THROMBOPLASTIN TIME PARTIAL: CPT

## 2019-03-05 PROCEDURE — 80053 COMPREHEN METABOLIC PANEL: CPT

## 2019-03-05 PROCEDURE — 83605 ASSAY OF LACTIC ACID: CPT

## 2019-03-05 PROCEDURE — 36415 COLL VENOUS BLD VENIPUNCTURE: CPT

## 2019-03-05 NOTE — ED STATDOCS - CARE PLAN
Principal Discharge DX:	Encounter for wellness examination in adult Principal Discharge DX:	Acute febrile illness

## 2019-03-05 NOTE — ED STATDOCS - PHYSICAL EXAMINATION
Constitutional: non-toxic appearing, no acute distress  HEENT: normal oropharynx, no cervical adenopathy  EYES: no conjunctival injection, no ocular discharge  RESPIRATORY: lungs clear  CARDIAC: heart regular, no murmur, no pedal edema  GI: abdomen soft, nontender  : no CVA tenderness  NEURO: neck supple

## 2019-03-05 NOTE — ED STATDOCS - NS ED MD DISPO DISCHARGE CCDA
Patient with sinus tachycardia up to 150s on ambulation, denies symptoms of dizziness or SOB, reports she can feel her heart beating fast  - TSH and orthostatics WNL  - TTE ordered  - F/u Cardiology on possibly starting rate control agent  - On discharge will need MCOT monitor and f/u with Dr. Dangelo Patient with sinus tachycardia up to 150s on ambulation, denies symptoms of dizziness or SOB, reports she can feel her heart beating fast  - TSH and orthostatics WNL  - TTE ordered  - Will start low dose metop 12.5mg BID  - On discharge will need MCOT monitor and f/u with Dr. Dangelo Patient with sinus tachycardia up to 150s on ambulation, denies symptoms of dizziness or SOB, reports she can feel her heart beating fast  - TSH and orthostatics WNL  - TTE ordered  - Will start low dose metop 12.5mg BID  - Will measure pulse ox on ambulation as desat may be driving tachycardia  - On discharge will need MCOT monitor and f/u with Dr. Dangelo Patient with sinus tachycardia up to 150s on ambulation, denies symptoms of dizziness or SOB, reports she can feel her heart beating fast, oxygen >97% on ambulation so hypoxia is not driving this process, TSH and orthstatic vitals WNL  - TTE ordered  - Will start low dose coreg 3.125mg BID (patient experienced hallucinations while on metoprolol tartrate)  - On discharge will need MCOT monitor and f/u with Dr. Dangelo Patient/Caregiver provided printed discharge information.

## 2019-03-05 NOTE — ED STATDOCS - OBJECTIVE STATEMENT
47 y/o M pt with PMHx prostate CA (on radiation and chemo and prednisone), adenocarcinoma presents to the ED for positive blood culture.  Pt came into the ED 2 days ago for one day of body aches, vomiting (2 episodes, non-bloody), and diarrhea (3 episodes, non-bloody), was admitted, had a low-grade fever, and was discharged home yesterday.  He was called back to the ED for positive blood culture.  Pt had diarrhea this morning, and notes mild abdominal pain.  Pt also reports feeling SOB last night, when walking and talking on the phone yesterday.  Per wife, pt has a severe wet cough every morning for the past several months.  Denies throat pain, dysuria, rash.  No further acute complaints at this time.  PMD: Dr. Ninoska Swanson  Oncologist: Dr. Munir Issa, [facility] 49 y/o M pt with PMHx prostate CA (on radiation and chemo and prednisone), adenocarcinoma presents to the ED for positive blood culture.  Pt came into the ED 2 days ago for one day of body aches,  low-grade fever, vomiting (2 episodes, non-bloody), and diarrhea (3 episodes, non-bloody).  Admitted to observation was discharged home yesterday.  He was called back to the ED for positive blood culture.  Pt had diarrhea this morning, and notes mild abdominal pain.  Pt also reports feeling SOB last night, when walking and talking on the phone yesterday.  Per wife, pt has a severe wet cough every morning for the past several months.  Denies throat pain, dysuria, rash.  Denies urinary urgency, frequency, hematuria or dysuria. No further acute complaints at this time.  PMD: Dr. Ninoska Swanson  Oncologist: Dr. Munir Issa, [facility]

## 2019-03-05 NOTE — ED STATDOCS - NS_ ATTENDINGSCRIBEDETAILS _ED_A_ED_FT
I, Louis Basurto, performed the initial face to face bedside interview with this patient regarding history of present illness, review of symptoms and relevant past medical, social and family history.  I completed an independent physical examination.  I was the provider who initially evaluated this patient.  The history, relevant review of systems, past medical and surgical history, medical decision making, and physical examination was documented by the scribe in my presence and I attest to the accuracy of the documentation. Follow-up on ordered tests (ie labs, radiologic studies) and re-evaluation of the patient's status has been communicated to the ACP.  Disposition of the patient will be based on test outcome and response to ED interventions.

## 2019-03-05 NOTE — ED STATDOCS - CLINICAL SUMMARY MEDICAL DECISION MAKING FREE TEXT BOX
positive blood culture, possible contaminant, repeat labs, and reassess. positive blood culture from previous work-up of febrile illness; feeling better, possible contaminant. Repeat labs, and contact ID for additional recommendations.

## 2019-03-05 NOTE — ED ADULT NURSE NOTE - OBJECTIVE STATEMENT
Pt states he was seen here on Sunday and was admitted to observation diagnosed with pneumonia and sent home with anabiotics. Pt was called today and told he had bacteria in his blood and to come in to confirm diagnosis of bacteria or if blood was contaminated.  Pt A & XO4, pt states he feels better since Sunday.

## 2019-03-05 NOTE — ED POST DISCHARGE NOTE - DETAILS
Pt contacted and told to come back to the ED asap due to +BC, pt acknowledged understanding and reported he will come back to ED.

## 2019-03-05 NOTE — ED STATDOCS - PROGRESS NOTE DETAILS
PA Note: PT evaluated by intake physician. HPI/PE/ROS as noted above. Will follow up plan per intake physician. Spoke with ID Dr. Pinto, reports positive blood cultures are a contaminant and pt is ok to discharge if afebrile. Pt notified of this. New blood cultures drawn today, notified pt he will be called if any are positive. Pt to follow up with oncologist.

## 2019-03-05 NOTE — ED ADULT NURSE NOTE - AS SC BRADEN ACTIVITY
Patient is anuric may need hemodialysis will discuss with nephrology and family   (3) walks occasionally

## 2019-03-06 LAB
-  AMPICILLIN/SULBACTAM: SIGNIFICANT CHANGE UP
-  CEFAZOLIN: SIGNIFICANT CHANGE UP
-  CLINDAMYCIN: SIGNIFICANT CHANGE UP
-  ERYTHROMYCIN: SIGNIFICANT CHANGE UP
-  GENTAMICIN: SIGNIFICANT CHANGE UP
-  OXACILLIN: SIGNIFICANT CHANGE UP
-  PENICILLIN: SIGNIFICANT CHANGE UP
-  RIFAMPIN: SIGNIFICANT CHANGE UP
-  TETRACYCLINE: SIGNIFICANT CHANGE UP
-  TRIMETHOPRIM/SULFAMETHOXAZOLE: SIGNIFICANT CHANGE UP
-  VANCOMYCIN: SIGNIFICANT CHANGE UP
CULTURE RESULTS: SIGNIFICANT CHANGE UP
METHOD TYPE: SIGNIFICANT CHANGE UP
ORGANISM # SPEC MICROSCOPIC CNT: SIGNIFICANT CHANGE UP
SPECIMEN SOURCE: SIGNIFICANT CHANGE UP

## 2019-03-08 LAB
CULTURE RESULTS: SIGNIFICANT CHANGE UP
SPECIMEN SOURCE: SIGNIFICANT CHANGE UP

## 2019-03-10 LAB
CULTURE RESULTS: SIGNIFICANT CHANGE UP
CULTURE RESULTS: SIGNIFICANT CHANGE UP
SPECIMEN SOURCE: SIGNIFICANT CHANGE UP
SPECIMEN SOURCE: SIGNIFICANT CHANGE UP

## 2019-04-03 NOTE — ED ADULT TRIAGE NOTE - NS ED TRIAGE AVPU SCALE
Pt discharged in [facility]. Accompanied by parents and Mehran Mehta.
Transfer to mother baby room [phone] in stable condition. Infant identification band verified by two nurses.
Alert-The patient is alert, awake and responds to voice. The patient is oriented to time, place, and person. The triage nurse is able to obtain subjective information.

## 2019-07-28 ENCOUNTER — TRANSCRIPTION ENCOUNTER (OUTPATIENT)
Age: 49
End: 2019-07-28

## 2020-02-12 ENCOUNTER — OUTPATIENT (OUTPATIENT)
Dept: OUTPATIENT SERVICES | Facility: HOSPITAL | Age: 50
LOS: 1 days | Discharge: HOME | End: 2020-02-12

## 2020-02-12 ENCOUNTER — OUTPATIENT (OUTPATIENT)
Dept: OUTPATIENT SERVICES | Facility: HOSPITAL | Age: 50
LOS: 1 days | Discharge: HOME | End: 2020-02-12
Payer: COMMERCIAL

## 2020-02-12 DIAGNOSIS — C61 MALIGNANT NEOPLASM OF PROSTATE: ICD-10-CM

## 2020-02-14 ENCOUNTER — RESULT REVIEW (OUTPATIENT)
Age: 50
End: 2020-02-14

## 2020-02-14 PROCEDURE — 88321 CONSLTJ&REPRT SLD PREP ELSWR: CPT

## 2020-02-18 ENCOUNTER — RESULT REVIEW (OUTPATIENT)
Age: 50
End: 2020-02-18

## 2020-02-18 ENCOUNTER — APPOINTMENT (OUTPATIENT)
Dept: HEMATOLOGY ONCOLOGY | Facility: CLINIC | Age: 50
End: 2020-02-18
Payer: COMMERCIAL

## 2020-02-18 VITALS
HEIGHT: 70.67 IN | DIASTOLIC BLOOD PRESSURE: 93 MMHG | BODY MASS INDEX: 40.74 KG/M2 | TEMPERATURE: 99 F | OXYGEN SATURATION: 98 % | SYSTOLIC BLOOD PRESSURE: 150 MMHG | RESPIRATION RATE: 17 BRPM | WEIGHT: 291.01 LBS | HEART RATE: 63 BPM

## 2020-02-18 DIAGNOSIS — F17.200 NICOTINE DEPENDENCE, UNSPECIFIED, UNCOMPLICATED: ICD-10-CM

## 2020-02-18 DIAGNOSIS — Z80.6 FAMILY HISTORY OF LEUKEMIA: ICD-10-CM

## 2020-02-18 DIAGNOSIS — Z80.1 FAMILY HISTORY OF MALIGNANT NEOPLASM OF TRACHEA, BRONCHUS AND LUNG: ICD-10-CM

## 2020-02-18 DIAGNOSIS — Z78.9 OTHER SPECIFIED HEALTH STATUS: ICD-10-CM

## 2020-02-18 DIAGNOSIS — N52.9 MALE ERECTILE DYSFUNCTION, UNSPECIFIED: ICD-10-CM

## 2020-02-18 LAB
ALBUMIN SERPL ELPH-MCNC: 4.6 G/DL
ALP BLD-CCNC: 102 U/L
ALT SERPL-CCNC: 23 U/L
ANION GAP SERPL CALC-SCNC: 12 MMOL/L
AST SERPL-CCNC: 17 U/L
BILIRUB SERPL-MCNC: 0.4 MG/DL
BUN SERPL-MCNC: 19 MG/DL
CALCIUM SERPL-MCNC: 10 MG/DL
CHLORIDE SERPL-SCNC: 102 MMOL/L
CO2 SERPL-SCNC: 25 MMOL/L
CREAT SERPL-MCNC: 0.81 MG/DL
GLUCOSE SERPL-MCNC: 90 MG/DL
HCT VFR BLD CALC: 40.7 % — SIGNIFICANT CHANGE UP (ref 39–50)
HGB BLD-MCNC: 14 G/DL — SIGNIFICANT CHANGE UP (ref 13–17)
MCHC RBC-ENTMCNC: 30.6 PG — SIGNIFICANT CHANGE UP (ref 27–34)
MCHC RBC-ENTMCNC: 34.4 G/DL — SIGNIFICANT CHANGE UP (ref 32–36)
MCV RBC AUTO: 88.9 FL — SIGNIFICANT CHANGE UP (ref 80–100)
PLATELET # BLD AUTO: 183 K/UL — SIGNIFICANT CHANGE UP (ref 150–400)
POTASSIUM SERPL-SCNC: 5.1 MMOL/L
PROT SERPL-MCNC: 7.2 G/DL
PSA SERPL-MCNC: <0.01 NG/ML
RBC # BLD: 4.58 M/UL — SIGNIFICANT CHANGE UP (ref 4.2–5.8)
RBC # FLD: 12.1 % — SIGNIFICANT CHANGE UP (ref 10.3–14.5)
SODIUM SERPL-SCNC: 140 MMOL/L
TESTOST SERPL-MCNC: <2.5 NG/DL
WBC # BLD: 4.3 K/UL — SIGNIFICANT CHANGE UP (ref 3.8–10.5)
WBC # FLD AUTO: 4.3 K/UL — SIGNIFICANT CHANGE UP (ref 3.8–10.5)

## 2020-02-18 PROCEDURE — 99205 OFFICE O/P NEW HI 60 MIN: CPT

## 2020-02-18 RX ORDER — CRANBERRY FRUIT EXTRACT 650 MG
CAPSULE ORAL
Refills: 0 | Status: ACTIVE | COMMUNITY

## 2020-02-18 RX ORDER — LEUPROLIDE ACETATE 22.5 MG
22.5 KIT INTRAMUSCULAR
Refills: 0 | Status: ACTIVE | COMMUNITY

## 2020-02-18 RX ORDER — PHENTOLAMINE MESYLATE 5 MG/1
INJECTION INTRAMUSCULAR; INTRAVENOUS
Refills: 0 | Status: ACTIVE | COMMUNITY

## 2020-02-18 RX ORDER — ABIRATERONE ACETATE 250 MG/1
250 TABLET, FILM COATED ORAL
Refills: 0 | Status: ACTIVE | COMMUNITY

## 2020-02-18 RX ORDER — ALPROSTADIL 100 %
POWDER (GRAM) MISCELLANEOUS
Refills: 0 | Status: ACTIVE | COMMUNITY

## 2020-02-18 RX ORDER — PAPAVERINE HYDROCHLORIDE 30 MG/ML
30 INJECTION, SOLUTION INTRAVENOUS
Refills: 0 | Status: ACTIVE | COMMUNITY

## 2020-02-18 NOTE — HISTORY OF PRESENT ILLNESS
[Disease: _____________________] : Disease: [unfilled] [T: ___] : T[unfilled] [N: ___] : N[unfilled] [M: ___] : M[unfilled] [AJCC Stage: ____] : AJCC Stage: [unfilled] [de-identified] : Jesus Fisher is seen in consultation on February 18, 2020. As previously followed at [facility] by Dr Beni Oliver. 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 He's been on androgen deprivation therapy since April of 2018. His last Lupron injection was on January 15, 2020 receiving a three-month injection. He received external beam radiation therapy to the right acetabulum to the prostate bed and pelvis. At the time of his last visit he was on abiraterone and prednisone since February 2019. The notes indicate that he developed a rash a few days after degaralix and he received zoledronic acid at the same time. [facility] [place] possible allergy to zoledronic acid and to degaralix.\par \par On November 7, 2018, his PSA was 15.8. One week later he underwent prostate biopsy showing Joseph 7 disease (4+3) with intraductal carcinoma present. Staging at that time revealed metastases. On April 3 of 2018 he had a core biopsy of the acetabulum which revealed metastatic prostate cancer. He underwent a radical prostatectomy on October of 2018 after 6 months of Lupron treatment. This revealed Joseph 8 tumor (4+4) with extracapsular extension and seminal vesicle involvement,ypT3b(R1)N1M1b. On January 29 of 2019 he started external beam radiation therapy to the prostate and pelvis to a total of 7660 cGy. This continued until March 21, 2019. He was started on abiraterone and prednisone in February 1, 2019. From February 12, 2019 every 14 he received external beam radiation therapy to the right femur to a total dose of 2700 cGy.\par \par PSA data indicates undetectable values from March 11, 2019 into January 15, 2020. An MRI of the pelvis was performed on September 10 of 2019 in followup and there were no new osseous metastases. There was prominent diffusely asymmetric obturator thickening and enhancement on the right in the superior iliac crest. Given the undetectable PSA, this was felt to be post treatment related and less likely malignant infiltration. A short-term followup was recommended. The summary note indicates that the PSA has been unmeasurable since December of 2018. Given the operative pathology, he received adjuvant external beam radiation therapy in addition to treatment of the right acetabulum. He had microsatellite stability with no somatic mutations on IMPACT testing from the right acetabular biopsy. He has a germline BRIP-1 mutation.\par \par His prior oncologist I discussed a minimum of one year duration of abiraterone and 2 years of androgen deprivation therapy. Although he noted that this was not standard of care. The note indicates that the January 15 would be his last planned ADT dosing.\par His last DEXA was in March 2019, demonstrating osteoporosis, but improved. The note indicates that there was stable subcentimeter lung nodules and a CT scan from December of 2018.\par The prostate biopsy revealed 5 cores a Joseph 7 (4+3), and 6 cores of Joseph 7, (3+4). Intraductal carcinoma was present in one core. The CT scan of the abdomen and pelvis revealed pelvic adenopathy concerning for metastatic disease. Lung nodules and indeterminate liver lesions were noted. A DEXA scan on March of 2018 showed osteoporosis. The CT scan of the chest showed small big sclerotic densities in the rib cage including a larger lesion corresponding to the site of mild uptake on recent PET. The radiologist that metastases could not be excluded.\par A PET/CT was performed on March 3, 2018. Normal foci of increased FDG uptake in the prostate, with FDG avid the bilateral iliac lymph nodes and faint uptake in the right acetabulum, read as consistent with osseous metastases. Mild metabolic mixed density in the posterior eighth rib, indeterminate. Pulmonary nodules were not avid. MRI on March 11 did not reveal any lesions within the liver. The original consultation records he had a diffuse body rash after receiving degaralix and denosumab.\par \par Feels well. No pains. Urine flow is good, nocturia x 4-6. Occ incontinence. No urgency, no blood, no dysuria. No edema of the legs. Infrequent hot flushes. 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 has used Trimix injections wih benefit. Wife now diagnosed with breast cancer [de-identified] : Cherrie 8 at prostatectomy, GG 4 [de-identified] : PSA 15.8 at diagnosis, with mets to bone and LNs [de-identified] : prostatectomy after 6 months ADT\par post op RT to prostate and right acetabulum [FreeTextEntry1] : Treated with hormonal therapy for 6 months, underwent prostatectomy. Biopsied acetabulum revealed metastatic disease to bone. Underwent radiation therapy post prostatectomy. Started abiraterone and prednisone in February of 2019. Oncologist stopped both the abiraterone and prednisone in January 2020 and recommended discontinuation of the Lupron with the last injection of the three-month Lupron in January 15, 2020.

## 2020-02-18 NOTE — PHYSICAL EXAM
[Fully active, able to carry on all pre-disease performance without restriction] : Status 0 - Fully active, able to carry on all pre-disease performance without restriction [Obese] : obese [Normal] : affect appropriate [de-identified] : no edema [de-identified] : no gynecomastia [FreeTextEntry1] : no induration noted [de-identified] : circumcised, testes normal, glans normal [de-identified] : fungal rash of groin and LLQ

## 2020-02-18 NOTE — ASSESSMENT
[Palliative Care Plan] : not applicable at this time [FreeTextEntry1] : Jesus Fisher was seen today in consultation. He was previously followed at [facility], but his insurance changed and is an accepted by them. He was last seen in January. He was diagnosed with Cherrie 8 tumor with both bone and lymph node metastases. He has been an 18 since April of 2018. His last Lupron injection was on January 15 of 2020. He had a prostatectomy performed and then received external beam radiation therapy to the prostate bed as well as to the right acetabulum where there was a solitary bone lesion. The bone lesion was biopsied and was shown to be prostate cancer. He was also treated with abiraterone and prednisone and has continued on it. In November of 2018, his PSA was 15.8. His Cherrie score was 7, was upgraded to [place] 8 (4+4) at the time of his prostatectomy. He had seminal vesicle involvement as well as bushra involvement and bone metastases. He received radiation from January 29 of 2019 until March 21, 2019 to a total gross of 7660 cGy. He was started on abiraterone and prednisone in February 1, 2019.\par \par He reports he feels well. There are no pains. Urinary flow was stated to be good. Nocturia occurs 4-6 times per with occasional incontinence. There is no urgency or hematuria. He denies any dysuria. There is no edema of the legs. He has infrequent hot flushes. He does have fatigue which is chronic. He works full-time as a contractor. He gained approximately 30 pounds since the start of therapy. He has utilized Trimix injections with benefit.\par \par A comprehensive history was obtained in the physical examination was performed. Documents were reviewed. His PSA has been undetectable for some time. Imaging studies are not available other than true reports.\par \par Today's laboratory results are pending. A CBC was normal. I suspect that his PSA will continue to be undetectable. His chemistry panel was normal, and his testosterone level was castrate. He was not part of a clinical trial in terms of utilizing abiraterone early on in the course of treatment. He says that he was told that we continue for one year. We discussed the role of intermittent therapy in patients with metastatic prostate cancer.\par \par I will contact him tomorrow regards to his PSA results. I was able to speak with Dr. Oliver, his prior oncologist at [facility]. We will have a more detailed discussion at his next visit in regards to the potential for discontinuation of therapy. He was told that his disease is incurable, and the hope would be that we could control it as long as possible. I would favor that he be continued on therapy, but this is a joint decision to be made by him based on adequate information in regards to his prognosis severity can make an informed decision.\par \par He is due for his next Lupron injection in April, and I will see him at that time.

## 2020-02-18 NOTE — REVIEW OF SYSTEMS
[Fatigue] : fatigue [Joint Pain] : joint pain [Hot Flashes] : hot flashes [Negative] : Genitourinary [Fever] : no fever [Chills] : no chills [Night Sweats] : no night sweats [Recent Change In Weight] : ~T no recent weight change [Joint Stiffness] : no joint stiffness [Muscle Pain] : no muscle pain [Muscle Weakness] : no muscle weakness [Skin Rash] : no skin rash [Dizziness] : no dizziness [Anxiety] : no anxiety [Depression] : no depression [Easy Bleeding] : no tendency for easy bleeding [FreeTextEntry2] : g [Easy Bruising] : no tendency for easy bruising [FreeTextEntry8] : libido s poor [FreeTextEntry9] : knees and ankles [de-identified] : No HA, no paresthesias,

## 2020-02-25 LAB — SURGICAL PATHOLOGY STUDY: SIGNIFICANT CHANGE UP

## 2020-03-13 ENCOUNTER — TRANSCRIPTION ENCOUNTER (OUTPATIENT)
Age: 50
End: 2020-03-13

## 2020-04-13 ENCOUNTER — APPOINTMENT (OUTPATIENT)
Dept: RADIOLOGY | Facility: CLINIC | Age: 50
End: 2020-04-13
Payer: COMMERCIAL

## 2020-04-13 ENCOUNTER — OUTPATIENT (OUTPATIENT)
Dept: OUTPATIENT SERVICES | Facility: HOSPITAL | Age: 50
LOS: 1 days | End: 2020-04-13
Payer: COMMERCIAL

## 2020-04-13 DIAGNOSIS — C61 MALIGNANT NEOPLASM OF PROSTATE: ICD-10-CM

## 2020-04-13 PROCEDURE — 73502 X-RAY EXAM HIP UNI 2-3 VIEWS: CPT

## 2020-04-13 PROCEDURE — 73502 X-RAY EXAM HIP UNI 2-3 VIEWS: CPT | Mod: 26,RT

## 2020-04-14 ENCOUNTER — OUTPATIENT (OUTPATIENT)
Dept: OUTPATIENT SERVICES | Facility: HOSPITAL | Age: 50
LOS: 1 days | Discharge: HOME | End: 2020-04-14

## 2020-04-14 DIAGNOSIS — C61 MALIGNANT NEOPLASM OF PROSTATE: ICD-10-CM

## 2020-04-21 ENCOUNTER — APPOINTMENT (OUTPATIENT)
Dept: INFUSION THERAPY | Facility: HOSPITAL | Age: 50
End: 2020-04-21

## 2020-04-21 ENCOUNTER — RESULT REVIEW (OUTPATIENT)
Age: 50
End: 2020-04-21

## 2020-04-21 ENCOUNTER — APPOINTMENT (OUTPATIENT)
Dept: HEMATOLOGY ONCOLOGY | Facility: CLINIC | Age: 50
End: 2020-04-21

## 2020-04-21 ENCOUNTER — APPOINTMENT (OUTPATIENT)
Dept: HEMATOLOGY ONCOLOGY | Facility: CLINIC | Age: 50
End: 2020-04-21
Payer: COMMERCIAL

## 2020-04-21 LAB
BASOPHILS # BLD AUTO: 0.02 K/UL — SIGNIFICANT CHANGE UP (ref 0–0.2)
BASOPHILS NFR BLD AUTO: 0.4 % — SIGNIFICANT CHANGE UP (ref 0–2)
EOSINOPHIL # BLD AUTO: 0.14 K/UL — SIGNIFICANT CHANGE UP (ref 0–0.5)
EOSINOPHIL NFR BLD AUTO: 2.7 % — SIGNIFICANT CHANGE UP (ref 0–6)
HCT VFR BLD CALC: 41.1 % — SIGNIFICANT CHANGE UP (ref 39–50)
HGB BLD-MCNC: 13.6 G/DL — SIGNIFICANT CHANGE UP (ref 13–17)
IMM GRANULOCYTES NFR BLD AUTO: 0.6 % — SIGNIFICANT CHANGE UP (ref 0–1.5)
LYMPHOCYTES # BLD AUTO: 0.48 K/UL — LOW (ref 1–3.3)
LYMPHOCYTES # BLD AUTO: 9.4 % — LOW (ref 13–44)
MCHC RBC-ENTMCNC: 29.4 PG — SIGNIFICANT CHANGE UP (ref 27–34)
MCHC RBC-ENTMCNC: 33.1 GM/DL — SIGNIFICANT CHANGE UP (ref 32–36)
MCV RBC AUTO: 88.8 FL — SIGNIFICANT CHANGE UP (ref 80–100)
MONOCYTES # BLD AUTO: 0.43 K/UL — SIGNIFICANT CHANGE UP (ref 0–0.9)
MONOCYTES NFR BLD AUTO: 8.4 % — SIGNIFICANT CHANGE UP (ref 2–14)
NEUTROPHILS # BLD AUTO: 4 K/UL — SIGNIFICANT CHANGE UP (ref 1.8–7.4)
NEUTROPHILS NFR BLD AUTO: 78.5 % — HIGH (ref 43–77)
NRBC # BLD: 0 /100 WBCS — SIGNIFICANT CHANGE UP (ref 0–0)
PLATELET # BLD AUTO: 213 K/UL — SIGNIFICANT CHANGE UP (ref 150–400)
RBC # BLD: 4.63 M/UL — SIGNIFICANT CHANGE UP (ref 4.2–5.8)
RBC # FLD: 13.5 % — SIGNIFICANT CHANGE UP (ref 10.3–14.5)
WBC # BLD: 5.1 K/UL — SIGNIFICANT CHANGE UP (ref 3.8–10.5)
WBC # FLD AUTO: 5.1 K/UL — SIGNIFICANT CHANGE UP (ref 3.8–10.5)

## 2020-04-21 PROCEDURE — 99213 OFFICE O/P EST LOW 20 MIN: CPT | Mod: 95

## 2020-04-21 NOTE — REVIEW OF SYSTEMS
[Hot Flashes] : hot flashes [Negative] : Genitourinary [Fever] : no fever [Chills] : no chills [Recent Change In Weight] : ~T no recent weight change [Chest Pain] : no chest pain [Cough] : no cough [Lower Ext Edema] : no lower extremity edema [Joint Pain] : no joint pain [Joint Stiffness] : no joint stiffness [SOB on Exertion] : no shortness of breath during exertion [Muscle Pain] : no muscle pain [Skin Rash] : no skin rash [Anxiety] : no anxiety [Dizziness] : no dizziness [Muscle Weakness] : no muscle weakness [Depression] : no depression [Easy Bruising] : no tendency for easy bruising [Easy Bleeding] : no tendency for easy bleeding [de-identified] : HA, see HPI, no paresthesias

## 2020-04-21 NOTE — HISTORY OF PRESENT ILLNESS
[Disease: _____________________] : Disease: [unfilled] [T: ___] : T[unfilled] [N: ___] : N[unfilled] [M: ___] : M[unfilled] [AJCC Stage: ____] : AJCC Stage: [unfilled] [de-identified] : Jesus Fisher is seen in consultation on February 18, 2020. As previously followed at [facility] by Dr Beni Oliver. 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 He's been on androgen deprivation therapy since April of 2018. His last Lupron injection was on January 15, 2020 receiving a three-month injection. He received external beam radiation therapy to the right acetabulum to the prostate bed and pelvis. At the time of his last visit he was on abiraterone and prednisone since February 2019. The notes indicate that he developed a rash a few days after degaralix and he received zoledronic acid at the same time. [facility] labeled as possible allergy to zoledronic acid and to degaralix.\par \par On November 7, 2018, his PSA was 15.8. One week later he underwent prostate biopsy showing [place] 7 disease (4+3) with intraductal carcinoma present. Staging at that time revealed metastases. On April 3 of 2018 he had a core biopsy of the acetabulum which revealed metastatic prostate cancer. He underwent a radical prostatectomy on October of 2018 after 6 months of Lupron treatment. This revealed [place] 8 tumor (4+4) with extracapsular extension and seminal vesicle involvement,ypT3b(R1)N1M1b. On January 29 of 2019 he started external beam radiation therapy to the prostate and pelvis to a total of 7660 cGy. This continued until March 21, 2019. He was started on abiraterone and prednisone in February 1, 2019. From February 12, 2019 every 14 he received external beam radiation therapy to the right femur to a total dose of 2700 cGy.\par \par PSA data indicates undetectable values from March 11, 2019 into January 15, 2020. An MRI of the pelvis was performed on September 10 of 2019 in followup and there were no new osseous metastases. There was prominent diffusely asymmetric obturator thickening and enhancement on the right in the superior iliac crest. Given the undetectable PSA, this was felt to be post treatment related and less likely malignant infiltration. A short-term followup was recommended. The summary note indicates that the PSA has been unmeasurable since December of 2018. Given the operative pathology, he received adjuvant external beam radiation therapy in addition to treatment of the right acetabulum. He had microsatellite stability with no somatic mutations on IMPACT testing from the right acetabular biopsy. He has a germline BRIP-1 mutation.\par \par His prior oncologist I discussed a minimum of one year duration of abiraterone and 2 years of androgen deprivation therapy. Although he noted that this was not standard of care. The note indicates that the January 15 would be his last planned ADT dosing.\par His last DEXA was in March 2019, demonstrating osteoporosis, but improved. The note indicates that there was stable subcentimeter lung nodules and a CT scan from December of 2018.\par The prostate biopsy revealed 5 cores a [place] 7 (4+3), and 6 cores of [place] 7, (3+4). Intraductal carcinoma was present in one core. The CT scan of the abdomen and pelvis revealed pelvic adenopathy concerning for metastatic disease. Lung nodules and indeterminate liver lesions were noted. A DEXA scan on March of 2018 showed osteoporosis. The CT scan of the chest showed small big sclerotic densities in the rib cage including a larger lesion corresponding to the site of mild uptake on recent PET. The radiologist that metastases could not be excluded.\par A PET/CT was performed on March 3, 2018. Normal foci of increased FDG uptake in the prostate, with FDG avid the bilateral iliac lymph nodes and faint uptake in the right acetabulum, read as consistent with osseous metastases. Mild metabolic mixed density in the posterior eighth rib, indeterminate. Pulmonary nodules were not avid. MRI on March 11 did not reveal any lesions within the liver. The original consultation records he had a diffuse body rash after receiving degaralix and denosumab.\par \par Feels well. No pains. Urine flow is good, nocturia x 4-6. Occ incontinence. No urgency, no blood, no dysuria. No edema of the legs. Infrequent hot flushes. 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 has used Trimix injections wih benefit. Wife now diagnosed with breast cancer [de-identified] : PSA 15.8 at diagnosis, with mets to bone and LNs [de-identified] : Cherrie 8 at prostatectomy, GG 4 [FreeTextEntry1] : Treated with hormonal therapy for 6 months, underwent prostatectomy. Biopsied acetabulum revealed metastatic disease to bone. Underwent radiation therapy post prostatectomy. Started abiraterone and prednisone in February of 2019. Oncologist stopped both the abiraterone and prednisone in January 2020 and recommended discontinuation of the Lupron with the last injection of the three-month Lupron in January 15, 2020. [de-identified] : tele-services...patient granted verbal permission fir tele-services on 4/21/20 at 12:25 PM.\par \par He had right hip and groin pain recently and was prescribed oxycodone, took it for 3 days, pain resolved. Has acetabular lesion that was previously rad[place] [facility] Feel well now. Occ hot flushes, no fatigue. Appetite is good, no weight loss. Urine flow is good, nocturia x 2-3. No dysuria, no hematuria, no incontinence. Libido poor, no erections of note, was to see urology about intracavernous injections, was not able to schedule as yet due tot he coronavirus crisis. No edema. Does not have BP cuff at home. No N/V/D/C.  [de-identified] : prostatectomy after 6 months ADT\par post op RT to prostate and right acetabulum

## 2020-04-21 NOTE — ASSESSMENT
[Palliative Care Plan] : not applicable at this time [FreeTextEntry1] : At [facility] today for Lupron injection, to have blood work. I am at the hospital, thus tele-services. Pain in right hip resolved. He has an acetabular lesion was was irradiated before. The pain has resolved. Tolerating abiraterone well w/o any issues, check PSA, LFTs today. \par \par All q.s answered to the best of my ability and to his apparent satisfaction. Referral to urology after covid crisis for intracavernous injection consideration. \par

## 2020-04-22 LAB
ALBUMIN SERPL ELPH-MCNC: 4.7 G/DL
ALP BLD-CCNC: 108 U/L
ALT SERPL-CCNC: 21 U/L
ANION GAP SERPL CALC-SCNC: 17 MMOL/L
AST SERPL-CCNC: 16 U/L
BILIRUB SERPL-MCNC: 0.2 MG/DL
BUN SERPL-MCNC: 17 MG/DL
CALCIUM SERPL-MCNC: 10 MG/DL
CHLORIDE SERPL-SCNC: 102 MMOL/L
CO2 SERPL-SCNC: 22 MMOL/L
CREAT SERPL-MCNC: 0.77 MG/DL
GLUCOSE SERPL-MCNC: 96 MG/DL
POTASSIUM SERPL-SCNC: 5 MMOL/L
PROT SERPL-MCNC: 7.5 G/DL
PSA SERPL-MCNC: <0.01 NG/ML
SODIUM SERPL-SCNC: 141 MMOL/L

## 2020-04-30 NOTE — ED ADULT NURSE NOTE - HARM RISK FACTORS
He reports urgency and frequency that has been gradually getting worse but the last two nights it was bad.  He reports urge incontinence sometimes.   No dysuria, gross hematuria.  He was instructed to limit fluid intake 3 hours before bed.  Drinks tea.  Recommend avoiding bladder irritants.  He is taking flomax, dutasteride, and oxybutynin 10mg.    Will check urine for an infection.  If no infection will adjust oab meds.    
no

## 2020-05-01 ENCOUNTER — APPOINTMENT (OUTPATIENT)
Dept: CT IMAGING | Facility: CLINIC | Age: 50
End: 2020-05-01
Payer: COMMERCIAL

## 2020-05-01 ENCOUNTER — OUTPATIENT (OUTPATIENT)
Dept: OUTPATIENT SERVICES | Facility: HOSPITAL | Age: 50
LOS: 1 days | End: 2020-05-01
Payer: COMMERCIAL

## 2020-05-01 DIAGNOSIS — C61 MALIGNANT NEOPLASM OF PROSTATE: ICD-10-CM

## 2020-05-01 PROCEDURE — 74177 CT ABD & PELVIS W/CONTRAST: CPT

## 2020-05-01 PROCEDURE — 74177 CT ABD & PELVIS W/CONTRAST: CPT | Mod: 26

## 2020-07-16 ENCOUNTER — OUTPATIENT (OUTPATIENT)
Dept: OUTPATIENT SERVICES | Facility: HOSPITAL | Age: 50
LOS: 1 days | Discharge: HOME | End: 2020-07-16

## 2020-07-16 DIAGNOSIS — C61 MALIGNANT NEOPLASM OF PROSTATE: ICD-10-CM

## 2020-07-17 ENCOUNTER — TRANSCRIPTION ENCOUNTER (OUTPATIENT)
Age: 50
End: 2020-07-17

## 2020-07-21 ENCOUNTER — RESULT REVIEW (OUTPATIENT)
Age: 50
End: 2020-07-21

## 2020-07-21 ENCOUNTER — APPOINTMENT (OUTPATIENT)
Dept: HEMATOLOGY ONCOLOGY | Facility: CLINIC | Age: 50
End: 2020-07-21
Payer: COMMERCIAL

## 2020-07-21 ENCOUNTER — APPOINTMENT (OUTPATIENT)
Dept: INFUSION THERAPY | Facility: HOSPITAL | Age: 50
End: 2020-07-21

## 2020-07-21 VITALS
OXYGEN SATURATION: 98 % | RESPIRATION RATE: 15 BRPM | WEIGHT: 295.86 LBS | DIASTOLIC BLOOD PRESSURE: 88 MMHG | HEART RATE: 56 BPM | SYSTOLIC BLOOD PRESSURE: 137 MMHG | TEMPERATURE: 98.4 F | BODY MASS INDEX: 41.65 KG/M2

## 2020-07-21 DIAGNOSIS — N52.31 ERECTILE DYSFUNCTION FOLLOWING RADICAL PROSTATECTOMY: ICD-10-CM

## 2020-07-21 LAB
BASOPHILS # BLD AUTO: 0.02 K/UL — SIGNIFICANT CHANGE UP (ref 0–0.2)
BASOPHILS NFR BLD AUTO: 0.4 % — SIGNIFICANT CHANGE UP (ref 0–2)
EOSINOPHIL # BLD AUTO: 0.09 K/UL — SIGNIFICANT CHANGE UP (ref 0–0.5)
EOSINOPHIL NFR BLD AUTO: 1.7 % — SIGNIFICANT CHANGE UP (ref 0–6)
HCT VFR BLD CALC: 40.3 % — SIGNIFICANT CHANGE UP (ref 39–50)
HGB BLD-MCNC: 13.7 G/DL — SIGNIFICANT CHANGE UP (ref 13–17)
IMM GRANULOCYTES NFR BLD AUTO: 1 % — SIGNIFICANT CHANGE UP (ref 0–1.5)
LYMPHOCYTES # BLD AUTO: 0.58 K/UL — LOW (ref 1–3.3)
LYMPHOCYTES # BLD AUTO: 11.2 % — LOW (ref 13–44)
MCHC RBC-ENTMCNC: 29.7 PG — SIGNIFICANT CHANGE UP (ref 27–34)
MCHC RBC-ENTMCNC: 34 GM/DL — SIGNIFICANT CHANGE UP (ref 32–36)
MCV RBC AUTO: 87.2 FL — SIGNIFICANT CHANGE UP (ref 80–100)
MONOCYTES # BLD AUTO: 0.46 K/UL — SIGNIFICANT CHANGE UP (ref 0–0.9)
MONOCYTES NFR BLD AUTO: 8.8 % — SIGNIFICANT CHANGE UP (ref 2–14)
NEUTROPHILS # BLD AUTO: 4 K/UL — SIGNIFICANT CHANGE UP (ref 1.8–7.4)
NEUTROPHILS NFR BLD AUTO: 76.9 % — SIGNIFICANT CHANGE UP (ref 43–77)
NRBC # BLD: 0 /100 WBCS — SIGNIFICANT CHANGE UP (ref 0–0)
PLATELET # BLD AUTO: 196 K/UL — SIGNIFICANT CHANGE UP (ref 150–400)
RBC # BLD: 4.62 M/UL — SIGNIFICANT CHANGE UP (ref 4.2–5.8)
RBC # FLD: 13.3 % — SIGNIFICANT CHANGE UP (ref 10.3–14.5)
WBC # BLD: 5.2 K/UL — SIGNIFICANT CHANGE UP (ref 3.8–10.5)
WBC # FLD AUTO: 5.2 K/UL — SIGNIFICANT CHANGE UP (ref 3.8–10.5)

## 2020-07-21 PROCEDURE — 99214 OFFICE O/P EST MOD 30 MIN: CPT

## 2020-07-21 RX ORDER — NYSTATIN 100000 [USP'U]/G
100000 CREAM TOPICAL TWICE DAILY
Qty: 1 | Refills: 1 | Status: DISCONTINUED | COMMUNITY
Start: 2020-02-18 | End: 2020-07-21

## 2020-07-21 RX ORDER — OXYCODONE 5 MG/1
5 TABLET ORAL EVERY 4 HOURS
Qty: 100 | Refills: 0 | Status: DISCONTINUED | COMMUNITY
Start: 2020-04-13 | End: 2020-07-21

## 2020-07-22 DIAGNOSIS — Z51.11 ENCOUNTER FOR ANTINEOPLASTIC CHEMOTHERAPY: ICD-10-CM

## 2020-07-22 DIAGNOSIS — R11.2 NAUSEA WITH VOMITING, UNSPECIFIED: ICD-10-CM

## 2020-07-22 LAB
ALBUMIN SERPL ELPH-MCNC: 4.7 G/DL
ALP BLD-CCNC: 105 U/L
ALT SERPL-CCNC: 20 U/L
ANION GAP SERPL CALC-SCNC: 12 MMOL/L
AST SERPL-CCNC: 16 U/L
BILIRUB SERPL-MCNC: 0.4 MG/DL
BUN SERPL-MCNC: 19 MG/DL
CALCIUM SERPL-MCNC: 9.9 MG/DL
CHLORIDE SERPL-SCNC: 102 MMOL/L
CO2 SERPL-SCNC: 25 MMOL/L
CREAT SERPL-MCNC: 0.76 MG/DL
GLUCOSE SERPL-MCNC: 96 MG/DL
POTASSIUM SERPL-SCNC: 4.7 MMOL/L
PROT SERPL-MCNC: 7.2 G/DL
PSA SERPL-MCNC: <0.01 NG/ML
SODIUM SERPL-SCNC: 139 MMOL/L
TESTOST SERPL-MCNC: <2.5 NG/DL

## 2020-07-22 NOTE — REVIEW OF SYSTEMS
[Hot Flashes] : hot flashes [Negative] : Eyes [Fever] : no fever [Chills] : no chills [Night Sweats] : no night sweats [Fatigue] : no fatigue [Recent Change In Weight] : ~T no recent weight change [Chest Pain] : no chest pain [Palpitations] : no palpitations [Lower Ext Edema] : no lower extremity edema [Cough] : no cough [SOB on Exertion] : no shortness of breath during exertion [Vomiting] : no vomiting [Constipation] : no constipation [Diarrhea] : no diarrhea [Dysuria] : no dysuria [Joint Pain] : no joint pain [Joint Stiffness] : no joint stiffness [Muscle Pain] : no muscle pain [Skin Rash] : no skin rash [Dizziness] : no dizziness [Anxiety] : no anxiety [Depression] : no depression [Muscle Weakness] : no muscle weakness [Easy Bleeding] : no tendency for easy bleeding [FreeTextEntry2] : appetite is good [Easy Bruising] : no tendency for easy bruising [FreeTextEntry8] : no hematuria [FreeTextEntry7] : no nausea [de-identified] : no pruritus [de-identified] : no HA, no paresthesias

## 2020-07-22 NOTE — PHYSICAL EXAM
[Fully active, able to carry on all pre-disease performance without restriction] : Status 0 - Fully active, able to carry on all pre-disease performance without restriction [Normal] : grossly intact [de-identified] : no gynecomastia

## 2020-07-22 NOTE — ASSESSMENT
[Palliative Care Plan] : not applicable at this time [FreeTextEntry1] : Mr. Fisher is seen in followup today. He received his Lupron injection today. He was last seen in April via telehealth services. He reports that he is taking abiraterone at 6:00 am on an empty stomach and prednisone once a day. He states that he feels "okay." His appetite is stated to be good and he feels like he gained weight. He states that he has been trying to lose weight. He denies dyspepsia, nausea, or vomiting. He denies constipation or diarrheal stools. There are no headaches or dizziness. There are no respiratory complaints. There are no skin rashes or pruritus. There are no pains noted. Previous hip pain has resolved. There is no significant fatigue or weakness. 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 His urinary flow is stated to be good and he has nocturia occurring 4-6 times per night. There is no hematuria or dysuria. He reports occasional incontinence as he wakes up with soaked underwear. He has not used adult diapers or pads. His libido is poor and he has no erections. He hasn't seen his urologist. He has not started on intracavernous injections. He is looking for a new urologist at this time. He experiences infrequent hot flashes with sweats. There is no edema in his lower extremities. He reports that he fell last night as he missed a step and landed on his left arm. He did not require to go to the emergency room. \par \par On physical examination, he appears well. The oral mucous membrane is well hydrated. There are no palpable lymph nodes in the examination of the neck. The lungs are clear to auscultation bilaterally. The heart examination is normal. There are no palpable abnormalities in the examination of the abdomen. Incisional port scars are healed on the abdomen. There is spinal column or chest wall tenderness to palpation or percussion. There is no edema in the lower extremities. \par \par He is due for his three-month Lupron injection today. His PSA was 15.8 at the time of his diagnosis. He has been on abiraterone and prednisone for 1.5 years now and he has been tolerating it well with no major adverse effects. His last CT of the abdomen and pelvis was done on May 1 revealing small nonspecific sclerotic foci in the right iliac wing and acetabulum, productive degenerative change posterior right ischium, and tiny sclerotic focus T2. He received external beam radiation therapy to the right acetabulum to the prostate bed and pelvis at [facility]. His PSA has been undetectable since February. \par \par He has been taking prednisone 5 mg once daily and he was instructed to take it twice a day instead of once daily. I gave him the contact number to Urology to get set-up for an appointment with a urologist. He will be seen again in 4 weeks' time. It was emphasized to him that he needs to have a followup every 4 weeks since he's on abiraterone. We could potentially stretch that to 6 weeks. \par \par Today's CBC reveals a white blood count of 5.2 with a hemoglobin value of 13.7 g/dL and a platelet count of 196,000. The rest of the laboratory values are still pending. All questions were answered to the best of my ability and to his apparent satisfaction.

## 2020-07-22 NOTE — HISTORY OF PRESENT ILLNESS
[T: ___] : T[unfilled] [Disease: _____________________] : Disease: [unfilled] [N: ___] : N[unfilled] [M: ___] : M[unfilled] [AJCC Stage: ____] : AJCC Stage: [unfilled] [de-identified] : Jesus Fisher is seen in consultation on February 18, 2020. As previously followed at [facility] by Dr Beni Oliver. 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 He's been on androgen deprivation therapy since April of 2018. His last Lupron injection was on January 15, 2020 receiving a three-month injection. He received external beam radiation therapy to the right acetabulum to the prostate bed and pelvis. At the time of his last visit he was on abiraterone and prednisone since February 2019. The notes indicate that he developed a rash a few days after degaralix and he received zoledronic acid at the same time. [facility] [place] possible allergy to zoledronic acid and to degaralix.\par \par On November 7, 2018, his PSA was 15.8. One week later he underwent prostate biopsy showing [place] 7 disease (4+3) with intraductal carcinoma present. Staging at that time revealed metastases. On April 3 of 2018 he had a core biopsy of the acetabulum which revealed metastatic prostate cancer. He underwent a radical prostatectomy on October of 2018 after 6 months of Lupron treatment. This revealed Moon[place] 8 tumor (4+4) with extracapsular extension and seminal vesicle involvement,ypT3b(R1)N1M1b. On January 29 of 2019 he started external beam radiation therapy to the prostate and pelvis to a total of 7660 cGy. This continued until March 21, 2019. He was started on abiraterone and prednisone in February 1, 2019. From February 12, 2019 every 14 he received external beam radiation therapy to the right femur to a total dose of 2700 cGy.\par \par PSA data indicates undetectable values from March 11, 2019 into January 15, 2020. An MRI of the pelvis was performed on September 10 of 2019 in followup and there were no new osseous metastases. There was prominent diffusely asymmetric obturator thickening and enhancement on the right in the superior iliac crest. Given the undetectable PSA, this was felt to be post treatment related and less likely malignant infiltration. A short-term followup was recommended. The summary note indicates that the PSA has been unmeasurable since December of 2018. Given the operative pathology, he received adjuvant external beam radiation therapy in addition to treatment of the right acetabulum. He had microsatellite stability with no somatic mutations on IMPACT testing from the right acetabular biopsy. He has a germline BRIP-1 mutation.\par \par His prior oncologist I discussed a minimum of one year duration of abiraterone and 2 years of androgen deprivation therapy. Although he noted that this was not standard of care. The note indicates that the January 15 would be his last planned ADT dosing.\par His last DEXA was in March 2019, demonstrating osteoporosis, but improved. The note indicates that there was stable subcentimeter lung nodules and a CT scan from December of 2018.\par The prostate biopsy revealed 5 cores a Moonachie 7 (4+3), and 6 cores of [place] 7, (3+4). Intraductal carcinoma was present in one core. The CT scan of the abdomen and pelvis revealed pelvic adenopathy concerning for metastatic disease. Lung nodules and indeterminate liver lesions were noted. A DEXA scan on March of 2018 showed osteoporosis. The CT scan of the chest showed small big sclerotic densities in the rib cage including a larger lesion corresponding to the site of mild uptake on recent PET. The radiologist that metastases could not be excluded.\par A PET/CT was performed on March 3, 2018. Normal foci of increased FDG uptake in the prostate, with FDG avid the bilateral iliac lymph nodes and faint uptake in the right acetabulum, read as consistent with osseous metastases. Mild metabolic mixed density in the posterior eighth rib, indeterminate. Pulmonary nodules were not avid. MRI on March 11 did not reveal any lesions within the liver. The original consultation records he had a diffuse body rash after receiving degaralix and denosumab.\par \par Feels well. No pains. Urine flow is good, nocturia x 4-6. Occ incontinence. No urgency, no blood, no dysuria. No edema of the legs. Infrequent hot flushes. 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 has used Trimix injections wih benefit. Wife now diagnosed with breast cancer.\par \par 4/21/20...tele-services...patient granted verbal permission for tele-services on 4/21/20 at 12:25 PM.\par \par He had right hip and groin pain recently and was prescribed oxycodone, took it for 3 days, pain resolved. Has acetabular lesion that was previously radiated along with prostate at [facility] [place]. Feel well now. Occ hot flushes, no fatigue. Appetite is good, no weight loss. Urine flow is good, nocturia x 2-3. No dysuria, no hematuria, no incontinence. Libido poor, no erections of note, was to see urology about intracavernous injections, was not able to schedule as yet due tot he coronavirus crisis. No edema. Does not have BP cuff at home. No N/V/D/C.  [de-identified] : Cherrie 8 at prostatectomy, GG 4 [de-identified] : PSA 15.8 at diagnosis, with mets to bone and LNs [FreeTextEntry1] : Treated with hormonal therapy for 6 months, underwent prostatectomy. Biopsied acetabulum revealed metastatic disease to bone. Underwent radiation therapy post prostatectomy. Started abiraterone and prednisone in February of 2019. Oncologist stopped both the abiraterone and prednisone in January 2020 and recommended discontinuation of the Lupron with the last injection of the three-month Lupron in January 15, 2020. [de-identified] : prostatectomy after 6 months ADT\par post op RT to prostate and right acetabulum [de-identified] : Last seen in April via TEB. Taking abiraterone at 6 am on an empty stomach and prednisone once a day. Feels "okay." Appetite is good, feels like he gained weight and has been trying to lose weight. No dyspepsia, nausea, or vomiting. No constipation or diarrheal stools. No headaches or dizziness. No cough or BARAJAS. No skin rashes or pruritus. No pains noted. No significant fatigue. No weakness. Working full-time without any problems. Urine flow is good, nocturia x 4-6. No hematuria or dysuria. Occ incontinence, waking up with soaked underwear, no adult diapers or pads used. Libido is poor, no erections. Hasn't seen urologist, not comfortable with the one he has and seeking for a new one, has not started on intracavernous injections. Infrequent hot flashes with sweats. No edema. Reports that he fell last night, missed a step, landed on his left arm, did not need to go to the ED.

## 2020-08-19 ENCOUNTER — OUTPATIENT (OUTPATIENT)
Dept: OUTPATIENT SERVICES | Facility: HOSPITAL | Age: 50
LOS: 1 days | Discharge: HOME | End: 2020-08-19

## 2020-08-19 DIAGNOSIS — C61 MALIGNANT NEOPLASM OF PROSTATE: ICD-10-CM

## 2020-08-26 ENCOUNTER — APPOINTMENT (OUTPATIENT)
Dept: HEMATOLOGY ONCOLOGY | Facility: CLINIC | Age: 50
End: 2020-08-26

## 2020-09-29 RX ORDER — PREDNISONE 5 MG/1
5 TABLET ORAL TWICE DAILY
Qty: 60 | Refills: 5 | Status: ACTIVE | COMMUNITY
Start: 1900-01-01 | End: 1900-01-01

## 2020-10-16 ENCOUNTER — OUTPATIENT (OUTPATIENT)
Dept: OUTPATIENT SERVICES | Facility: HOSPITAL | Age: 50
LOS: 1 days | Discharge: HOME | End: 2020-10-16

## 2020-10-16 DIAGNOSIS — C61 MALIGNANT NEOPLASM OF PROSTATE: ICD-10-CM

## 2020-10-21 ENCOUNTER — RESULT REVIEW (OUTPATIENT)
Age: 50
End: 2020-10-21

## 2020-10-21 ENCOUNTER — APPOINTMENT (OUTPATIENT)
Dept: HEMATOLOGY ONCOLOGY | Facility: CLINIC | Age: 50
End: 2020-10-21
Payer: COMMERCIAL

## 2020-10-21 ENCOUNTER — APPOINTMENT (OUTPATIENT)
Dept: INFUSION THERAPY | Facility: HOSPITAL | Age: 50
End: 2020-10-21

## 2020-10-21 VITALS
SYSTOLIC BLOOD PRESSURE: 139 MMHG | TEMPERATURE: 97.4 F | RESPIRATION RATE: 17 BRPM | WEIGHT: 291.45 LBS | HEIGHT: 70.67 IN | DIASTOLIC BLOOD PRESSURE: 86 MMHG | OXYGEN SATURATION: 98 % | BODY MASS INDEX: 40.8 KG/M2 | HEART RATE: 68 BPM

## 2020-10-21 LAB
BASOPHILS # BLD AUTO: 0.03 K/UL — SIGNIFICANT CHANGE UP (ref 0–0.2)
BASOPHILS NFR BLD AUTO: 0.7 % — SIGNIFICANT CHANGE UP (ref 0–2)
EOSINOPHIL # BLD AUTO: 0.15 K/UL — SIGNIFICANT CHANGE UP (ref 0–0.5)
EOSINOPHIL NFR BLD AUTO: 3.5 % — SIGNIFICANT CHANGE UP (ref 0–6)
HCT VFR BLD CALC: 40.4 % — SIGNIFICANT CHANGE UP (ref 39–50)
HGB BLD-MCNC: 13.7 G/DL — SIGNIFICANT CHANGE UP (ref 13–17)
IMM GRANULOCYTES NFR BLD AUTO: 0.7 % — SIGNIFICANT CHANGE UP (ref 0–1.5)
LYMPHOCYTES # BLD AUTO: 0.71 K/UL — LOW (ref 1–3.3)
LYMPHOCYTES # BLD AUTO: 16.6 % — SIGNIFICANT CHANGE UP (ref 13–44)
MCHC RBC-ENTMCNC: 29.9 PG — SIGNIFICANT CHANGE UP (ref 27–34)
MCHC RBC-ENTMCNC: 33.9 G/DL — SIGNIFICANT CHANGE UP (ref 32–36)
MCV RBC AUTO: 88.2 FL — SIGNIFICANT CHANGE UP (ref 80–100)
MONOCYTES # BLD AUTO: 0.46 K/UL — SIGNIFICANT CHANGE UP (ref 0–0.9)
MONOCYTES NFR BLD AUTO: 10.7 % — SIGNIFICANT CHANGE UP (ref 2–14)
NEUTROPHILS # BLD AUTO: 2.9 K/UL — SIGNIFICANT CHANGE UP (ref 1.8–7.4)
NEUTROPHILS NFR BLD AUTO: 67.8 % — SIGNIFICANT CHANGE UP (ref 43–77)
NRBC # BLD: 0 /100 WBCS — SIGNIFICANT CHANGE UP (ref 0–0)
PLATELET # BLD AUTO: 209 K/UL — SIGNIFICANT CHANGE UP (ref 150–400)
RBC # BLD: 4.58 M/UL — SIGNIFICANT CHANGE UP (ref 4.2–5.8)
RBC # FLD: 13.1 % — SIGNIFICANT CHANGE UP (ref 10.3–14.5)
WBC # BLD: 4.28 K/UL — SIGNIFICANT CHANGE UP (ref 3.8–10.5)
WBC # FLD AUTO: 4.28 K/UL — SIGNIFICANT CHANGE UP (ref 3.8–10.5)

## 2020-10-21 PROCEDURE — 99213 OFFICE O/P EST LOW 20 MIN: CPT

## 2020-10-21 RX ORDER — SILDENAFIL 20 MG/1
20 TABLET ORAL
Refills: 0 | Status: DISCONTINUED | COMMUNITY
End: 2020-10-21

## 2020-10-21 NOTE — ASSESSMENT
[FreeTextEntry1] : Her mother was seen in the office in follow-up today.  He has been followed by me since earlier this year.  He was found to have Cherrie 8 tumor with bone and lymph node metastasis.  He underwent a prostatectomy followed by radiation therapy.  He also received radiation therapy to a lesion within his right femur.  His PSA has been undetectable since March 11 of 2019.  He has been on abiraterone and prednisone along with Lupron.  He is due for his Lupron therapy today, I explained to him that he would be getting Eligard instead.\par \par He states that he feels "all right".  He continues to work full-time.  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  He has occasional hot flushes.  His appetite is good and his weight is stable.  Urinary flow is good with nocturia occurring 3-4 times per night.  There is no hematuria incontinence or dysuria.  He does not have any urgency.  He is not using intracavernosal injections.  He did have a good erection in the physician's office when it was first administered, but he does not have much of a libido states that his wife may not be willing partner at this time.  He reports no edema or headaches.  There is no fatigue noted.\par \par On physical examination, he appears in no acute distress.  There is no palpable adenopathy.  There is no spinal column or chest wall tenderness palpation or percussion.  The heart examination is normal and the chest is clear.  There is no edema of the extremities.\par \par His last PSA was in July 21.  He was supposed to have a telehealth visit in August and orders were placed, but the blood work was never done, and the telehealth did not happen.  We discussed how it is important to be followed on a routine basis with abiraterone and prednisone for many reasons.\par \par We will do a TEB visit in 6 weeks time, and an office visit in 3 months time when he is due for his next Eligard injection.  Orders were placed for the blood work before his next telehealth visit.  All questions were answered to the best of my ability and to his apparent satisfaction.

## 2020-10-21 NOTE — PHYSICAL EXAM
[Fully active, able to carry on all pre-disease performance without restriction] : Status 0 - Fully active, able to carry on all pre-disease performance without restriction [Normal] : affect appropriate [de-identified] : no edema [de-identified] : no gynecomastia

## 2020-10-21 NOTE — REVIEW OF SYSTEMS
[Muscle Pain] : muscle pain [Hot Flashes] : hot flashes [Negative] : Gastrointestinal [Fever] : no fever [Chills] : no chills [Fatigue] : no fatigue [Recent Change In Weight] : ~T no recent weight change [Chest Pain] : no chest pain [Palpitations] : no palpitations [Lower Ext Edema] : no lower extremity edema [Wheezing] : no wheezing [Cough] : no cough [SOB on Exertion] : no shortness of breath during exertion [Dysuria] : no dysuria [Incontinence] : no incontinence [Joint Pain] : no joint pain [Skin Rash] : no skin rash [Dizziness] : no dizziness [Anxiety] : no anxiety [Depression] : no depression [Muscle Weakness] : no muscle weakness [Easy Bleeding] : no tendency for easy bleeding [Easy Bruising] : no tendency for easy bruising [FreeTextEntry9] : lateral legs, present for many years [de-identified] : No HA, no paresthesias

## 2020-10-21 NOTE — HISTORY OF PRESENT ILLNESS
[de-identified] : Jesus Fisher is seen in consultation on February 18, 2020. As previously followed at [facility] by Dr Beni Oliver. 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 He's been on androgen deprivation therapy since April of 2018. His last Lupron injection was on January 15, 2020 receiving a three-month injection. He received external beam radiation therapy to the right acetabulum to the prostate bed and pelvis. At the time of his last visit he was on abiraterone and prednisone since February 2019. The notes indicate that he developed a rash a few days after degaralix and he received zoledronic acid at the same time. [facility] [place] labeled as possible allergy to zoledronic acid and to degaralix.\par \par On November 7, 2018, his PSA was 15.8. One week later he underwent prostate biopsy showing [place] 7 disease (4+3) with intraductal carcinoma present. Staging at that time revealed metastases. On April 3 of 2018 he had a core biopsy of the acetabulum which revealed metastatic prostate cancer. He underwent a radical prostatectomy on October of 2018 after 6 months of Lupron treatment. This revealed [place] 8 tumor (4+4) with extracapsular extension and seminal vesicle involvement,ypT3b(R1)N1M1b. On January 29 of 2019 he started external beam radiation therapy to the prostate and pelvis to a total of 7660 cGy. This continued until March 21, 2019. He was started on abiraterone and prednisone in February 1, 2019. From February 12, 2019 14 he received external beam radiation therapy to the right femur to a total dose of 2700 cGy.\par \par PSA data indicates undetectable values from March 11, 2019 into January 15, 2020. An MRI of the pelvis was performed on September 10 of 2019 in followup and there were no new osseous metastases. There was prominent diffusely asymmetric obturator thickening and enhancement on the right in the superior iliac crest. Given the undetectable PSA, this was felt to be post treatment related and less likely malignant infiltration. A short-term followup was recommended. The summary note indicates that the PSA has been unmeasurable since December of 2018. Given the operative pathology, he received adjuvant external beam radiation therapy in addition to treatment of the right acetabulum. He had microsatellite stability with no somatic mutations on IMPACT testing from the right acetabular biopsy. He has a germline BRIP-1 mutation.\par \par His prior oncologist I discussed a minimum of one year duration of abiraterone and 2 years of androgen deprivation therapy. Although he noted that this was not standard of care. The note indicates that the January 15 would be his last planned ADT dosing.\par His last DEXA was in March 2019, demonstrating osteoporosis, but improved. The note indicates that there was stable subcentimeter lung nodules and a CT scan from December of 2018.\par The prostate biopsy revealed 5 cores a [place] 7 (4+3), and 6 cores of [place] 7, (3+4). Intraductal carcinoma was present in one core. The CT scan of the abdomen and pelvis revealed pelvic adenopathy concerning for metastatic disease. Lung nodules and indeterminate liver lesions were noted. A DEXA scan on March of 2018 showed osteoporosis. The CT scan of the chest showed small big sclerotic densities in the rib cage including a larger lesion corresponding to the site of mild uptake on recent PET. The radiologist that metastases could not be excluded.\par A PET/CT was performed on March 3, 2018. Normal foci of increased FDG uptake in the prostate, with FDG avid the bilateral iliac lymph nodes and faint uptake in the right acetabulum, read as consistent with osseous metastases. Mild metabolic mixed density in the posterior eighth rib, indeterminate. Pulmonary nodules were not avid. MRI on March 11 did not reveal any lesions within the liver. The original consultation records he had a diffuse body rash after receiving degaralix and denosumab.\par \par Feels well. No pains. Urine flow is good, nocturia x 4-6. Occ incontinence. No urgency, no blood, no dysuria. No edema of the legs. Infrequent hot flushes. 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 has used Trimix injections wih benefit. Wife now diagnosed with breast cancer.\par \par 4/21/20...tele-services...patient granted verbal permission for tele-services on 4/21/20 at 12:25 PM.\par \par He had right hip and groin pain recently and was prescribed oxycodone, took it for 3 days, pain resolved. Has acetabular lesion that was previously radiated along with prostate at [facility] [place]. Feel well now. Occ hot flushes, no fatigue. Appetite is good, no weight loss. Urine flow is good, nocturia x 2-3. No dysuria, no hematuria, no incontinence. Libido poor, no erections of note, was to see urology about intracavernous injections, was not able to schedule as yet due tot he coronavirus crisis. No edema. Does not have BP cuff at home. No N/V/D/C. \par \par 7/21/20...Last seen in April via TEB. Taking abiraterone at 6 am on an empty stomach and prednisone once a day. Feels "okay." Appetite is good, feels like he gained weight and has been trying to lose weight. No dyspepsia, nausea, or vomiting. No constipation or diarrheal stools. No headaches or dizziness. No cough or BARAJAS. No skin rashes or pruritus. No pains noted. No significant fatigue. No weakness. Working full-time without any problems. Urine flow is good, nocturia x 4-6. No hematuria or dysuria. Occ incontinence, waking up with soaked underwear, no adult diapers or pads used. Libido is poor, no erections. Hasn't seen urologist, not comfortable with the one he has and seeking for a new one, has not started on intracavernous injections. Infrequent hot flashes with sweats. No edema. Reports that he fell last night, missed a step, landed on his left arm, did not need to go to the ED.   [de-identified] : Chrerie 8 at prostatectomy, GG 4 [de-identified] : PSA 15.8 at diagnosis, with mets to bone and LNs [de-identified] : prostatectomy after 6 months ADT\par post op RT to prostate and right acetabulum [FreeTextEntry1] : Treated with hormonal therapy for 6 months, underwent prostatectomy. Biopsied acetabulum revealed metastatic disease to bone. Underwent radiation therapy post prostatectomy. Started abiraterone and prednisone in February of 2019. Oncologist stopped both the abiraterone and prednisone in January 2020 and recommended discontinuation of the Lupron with the last injection of the three-month Lupron in January 15, 2020. [de-identified] : Feels "all right". 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 He has occasional hot flushes. Appetite is good, weight is stable. urine flow is good, nocturia x 3-4. No blood, no incontinence, no dysuria. No urgency. has not used the intracavernal injections, had good erection in the MDs office, but does not have much of libido. No edema, no headaches. No fatigue  noted.

## 2020-10-22 LAB
ALBUMIN SERPL ELPH-MCNC: 4.6 G/DL
ALP BLD-CCNC: 110 U/L
ALT SERPL-CCNC: 19 U/L
ANION GAP SERPL CALC-SCNC: 11 MMOL/L
AST SERPL-CCNC: 12 U/L
BILIRUB SERPL-MCNC: 0.4 MG/DL
BUN SERPL-MCNC: 24 MG/DL
CALCIUM SERPL-MCNC: 9.6 MG/DL
CHLORIDE SERPL-SCNC: 103 MMOL/L
CO2 SERPL-SCNC: 25 MMOL/L
CREAT SERPL-MCNC: 0.75 MG/DL
GLUCOSE SERPL-MCNC: 84 MG/DL
POTASSIUM SERPL-SCNC: 4.2 MMOL/L
PROT SERPL-MCNC: 7 G/DL
PSA SERPL-MCNC: <0.01 NG/ML
SODIUM SERPL-SCNC: 140 MMOL/L

## 2020-10-22 NOTE — ED CDU PROVIDER SUBSEQUENT DAY NOTE - NEUROLOGICAL, MLM
[facility]  October 22, 2020     CONSULTATION - REFERRAL  Izaiah Liriano MD    VITALS: There were no vitals taken for this visit.     ALLERGIES:   ALLERGIES:   Allergen Reactions   • Lisinopril SWELLING     Of face   • Lyrica [Pregabalin] SWELLING     Of face   • Codeine Nausea & Vomiting       MEDICATIONS:  Outpatient Medications Marked as Taking for the 10/22/20 encounter (Office Visit) with Summer Rob MD   Medication Sig Dispense Refill   • Easy Comfort Pen Needles 31G X 5 MM Misc USE WITH INSULIN PEN 4 TIMES A  each 5   • tamsulosin (FLOMAX) 0.4 MG Cap TAKE 1 CAPSULE BY MOUTH 2 TIMES DAILY 60 capsule 5   • lactulose (Enulose) 10 GM/15ML solution Take 15 mLs by mouth 3 times daily as needed (constipations). 480 mL 5   • traZODone (DESYREL) 100 MG tablet TAKE ONE TABLET AT BED TIME 30 tablet 5   • tiZANidine (ZANAFLEX) 4 MG tablet TAKE 1 TABLET BY MOUTH AT BEDTIME AS NEEDED FOR HEADACHE 30 tablet 5   • carvedilol (COREG) 6.25 MG tablet TAKE 1 TABLET BY MOUTH TWICE DAILY 60 tablet 5   • pantoprazole (PROTONIX) 40 MG tablet TAKE 1 TABLET BY MOUTH ONCE DAILY 30 tablet 5   • Vitamin D, Ergocalciferol, 1.25 mg (50,000 units) capsule TAKE 1 CAPSULE BY MOUTH WEEKLY 4 capsule 5   • sertraline (ZOLOFT) 25 MG tablet TAKE 1 TABLET BY MOUTH ONCE DAILY 30 tablet 5   • fluticasone (FLONASE) 50 MCG/ACT nasal spray SHAKE LIQUID AND USE 1 SPRAY IN EACH NOSTRIL TWICE DAILY 16 g 5   • Aspirin Low Dose 81 MG EC tablet TAKE 1 TABLET BY MOUTH ONCE DAILY 30 tablet 1   • amLODIPine (NORVASC) 5 MG tablet TAKE 1 TABLET BY MOUTH ONCE DAILY 30 tablet 5   • ammonium lactate (AMLACTIN) 12 % lotion Apply topically 2 times daily. 225 g 5   • polyethylene glycol (MiraLax) 17 GM/SCOOP powder Take 17 g by mouth 2 times daily. Stir and dissolve powder in any 4 to 8 ounces of beverage, then drink. 238 g 3   • nicotine (NICODERM) 21 MG/24HR patch Place 1 patch onto the skin daily. 28 patch 3   • HumaLOG Mix 75/25  KwikPen (75-25) 100 UNIT/ML pen-injector INJECT 15 UNITS UNDER THE SKIN TWICE DAILY 15 mL 1   • dexamethasone (DECADRON) 4 MG tablet Take 1 tablet by mouth 2 times daily. Take for 3 days with each chemotherapy cycle. Start the day before each pemetrexed treatment. 30 tablet 3   • metoCLOPramide (REGLAN) 5 MG tablet TAKE 1 TABLET BY MOUTH 3 TIMES DAILY 90 tablet 5   • folic acid (FOLATE) 1 MG tablet Take 1 tablet by mouth daily. Start 7 days prior to the first dose of pemetrexed. Continue until 21 days after last dose of pemetrexed. 30 tablet 11   • simethicone 125 MG capsule Take 1 capsule by mouth 3 times daily as needed (Gas/bloating). 30 capsule 0   • acetaminophen (TYLENOL) 325 MG tablet Take 1-2 tablets by mouth every 4 hours as needed (mild pain).     • TRELEGY ELLIPTA 100-62.5-25 MCG/INH inhaler INHALE 1 PUFF ONCE DAILY (Patient taking differently: Inhale 1 puff into the lungs daily. ) 60 each 5   • finasteride (PROSCAR) 5 MG tablet TAKE 1 TABLET BY MOUTH ONCE DAILY (Patient taking differently: Take 5 mg by mouth daily. ) 30 tablet 5   • PROAIR  (90 Base) MCG/ACT inhaler INHALE 2 PUFFS EVERY 4 HOURS AS NEEDED FOR COUGH (Patient taking differently: Inhale 2 puffs into the lungs every 4 hours as needed for Shortness of Breath or Wheezing. ) 8.5 g 11   • latanoprost (XALATAN) 0.005 % ophthalmic solution Place 1 drop into both eyes nightly.   6   • Multiple Vitamins-Minerals (ONE DAILY PLUS MINERALS) Tab TAKE 1 TABLET BY MOUTH ONCE DAILY (Patient taking differently: Take 1 tablet by mouth daily. ) 90 tablet 1   • Alcohol Swabs (ALCOHOL PADS) 70 % Pads USE AS DIRECTED TO CLEAN SKIN BEFORE AND AFTER USING INSULIN OR CHECKING BLOOD SUGAR UP TO 8 TIMES A  each 11   • atorvastatin (LIPITOR) 40 MG tablet Take 1 tablet by mouth nightly. 90 tablet 0   • amylase-lipase-protease 109,000-20,000-68,000 units (ZENPEP) per capsule Take 2 capsules by mouth 3 times daily (with meals).          HISTORY OF PRESENT  ILLNESS:  Mr. Hernandez is a 68-year-old left-handed gentleman who is on permanent disability, is referred here by Dr. Izaiah Liriano for evaluation chronic neck pain with some radiation to both shoulders.    Mr. Hernandez tells me that he has been experiencing neck pain for many years, at least for 5 years.  He tells me that he used to drink and had falls which might have caused his chronic neck pain.  He tells me that he stopped drinking about 4 years ago.  He denies any significant change in his neck and shoulder pain in the recent past.  He denies any shooting pain down the upper experience.  He also has some diffuse pain over entire spine but his major pain is over the neck area.  He tells me that he recently finished physical therapy for 8-10 sessions and according to him it did not help.  He tells me that he had chiropractic treatments about 2 years ago which possibly helped temporarily.  He tried Tylenol and Aleve without help.  His medication list includes tizanidine and sertraline. He denies any history of cervical spinal injection or surgery.  He did not have acupuncture or massage this.  He has no history of shoulder surgery.  He has history of lung cancer and he tells me that it was diagnosed this year.  His past medical history also includes avascular necrosis of bilateral hips, status post surgeries to both hips, diabetes mellitus type 2, peripheral neuropathy, chronic alcoholic pancreatitis among others.  He denies depression or anxiety.  He lives by himself.  He smokes about 6 cigarettes a day.  He denies drinking.  He mentions about history of heavy alcohol use and he tells me that he has not been drinking for the last 4 years.    · Severity - Current is 7; best is 3/10; worst is 7/10.  · Time of day when pain is usually worse - no difference  · Sitting tolerance - 10-30 minutes  · Standing tolerance - 10-30 minutes  · Walking tolerance - 10-30 minutes  · Significant recent change with bladder and/or  bowel control - No  · Involved in a legal process regarding current symptoms - No  · History of similar symptoms in the past - Yes  · Is the patient on any blood thinners other than aspirin (i.e. Coumadin or Plavix) - No  · Is the patient aware of any contrast dye allergy - No    TREATMENT FOR CURRENT SYMPTOMS:  · Medications -  No  · Physical Therapy - Yes  · Chiropractic - No  · Epidural/spinal injection - No  · Spine surgery - No       REVIEW OF SYSTEMS  Have you been experiencing any of the following lately?: (check all that apply)    GENERAL:  []  Fatigue  []  Fever  []  Loss of appetite   []  Unexplained weight loss  HEENT:  []  Blurred vision  []  Hoarseness   ENDOCRINE:  []  Chills  []  Night sweats  GASTROINTESTINAL:  []  Heartburn/acid stomach  []  Abdominal pain  GENITOURINARY:  []  Loss of bladder and/or bowel control  HEMATOLOGICAL:   []  Bleeding disorder  MUSCULOSKELETAL:   [x]  Joint pain  NEUROLOGICAL:  [x]  Headache  []  Numbness  PSYCHOLOGICAL:  []  Depression and/or anxiety  INTEGUMENTARY:  []  Rash    PAST MEDICAL HISTORY:  Past Medical History:   Diagnosis Date   • Chronic headaches    • Chronic pain    • COPD (chronic obstructive pulmonary disease) (CMS/HCC)    • Diabetes    • Drug abuse NEC, unspec    • Eczema    • Fatty liver    • Gastritis and duodenitis    • Gastroesophageal reflux disease    • Glaucoma    • Hepatitis B infection    • Hepatitis C    • History of ETOH abuse    • HTN (hypertension)    • Hyperlipidemia    • Insomnia    • LBP (low back pain)    • Lung cancer (CMS/HCC)    • Pancreatitis    • s/p left robotic upper lobectomy and LND 6/25/2020   • Smoking    • Urinary tract infection    • Vitamin D deficiency    • Walker as ambulation aid    • Wears dentures     upper   • Wears glasses         Past Surgical History:   Procedure Laterality Date   • Abdomen surgery     • Colonoscopy diagnostic     • Cyst removal      pancreatitis cyst remove   • Egd  11/09/2018   •  Esophagogastroduodenoscopy  01/07/2020    Mild gastric mucosal erythema, laxity GE junction,   • Ir lung biopsy  05/29/2020   • Joint replacement     • Pelvis/hip joint surgery unlisted      bilateral replaced and removed        FAMILY HISTORY:  Any significant family history of back/spine related issues - No    SOCIAL HISTORY:  Living situation: The patient lives with self  Work status:  Permanently Disabled   HABITS:  Does the patient have a history of drug or alcohol abuse - Yes    Social History     Tobacco Use   Smoking Status Current Every Day Smoker   • Packs/day: 1.50   • Years: 52.00   • Pack years: 78.00   • Types: Cigarettes   Smokeless Tobacco Never Used   Tobacco Comment    Currently 6 cigarettes daily        Social History     Substance and Sexual Activity   Alcohol Use Not Currently   • Frequency: Never   • Drinks per session: 1 or 2   • Binge frequency: Never    Comment: last drink 2016        PHYSICAL EXAMINATION:  GENERAL: Alert and oriented x 3.   The patient is in no acute distress at this time.   The patient has a normal affect.  Body habitus: BMI: 23.18 kg/m².    CERVICAL SPINE EXAMINATION:  MUSCULOSKELETAL: Cervical spine range of motions are fairly full without any specific pain at this time.  Left shoulder abduction is limited but not painful.  Otherwise, both shoulder range of motions are fairly full and painless.  Resistive motions of left shoulder are not painful.  There is no tenderness over cervical paraspinals or cervical facet joints on either side.  There is no tenderness over trapezius or adjoining muscles on either side.  There is no obvious deformity noted over cervical spine or over upper extremities on inspection.  NEUROLOGIC: Light touch sensation is intact at both upper extremities.  Deep tendon reflexes at biceps, triceps and brachioradialis are normal and symmetric bilaterally.  Manual muscle testing shows normal and equal strength at both upper extremities.  No focal muscle  atrophy noted in the upper extremities.  Macario sign is negative bilaterally.  Knee reflexes are normal bilaterally.  Ankle reflex could not be elicited on either side.  Babinski sign is negative bilaterally.  Ankle clonus is absent bilaterally.  Muscle tone is normal at all extremities.    GAIT:  He walks using a walker with difficulty, which could be related to history of bilateral hip pathology.    DATA:  X-rays of cervical spine done on 09/18/2020.  I reviewed the images.  Diffuse degenerative changes noted.  2 mm lateral displacement of C1 and C2, unchanged from 2016.     ASSESSMENT:  Chronic neck pain with radiation to both shoulders without radicular symptoms and without any significant focal neurologic deficit that could be attributed to cervical spine.  There is no upper motor neuron findings. There is decreased left shoulder abduction without pain.  The patient has multiple medical comorbidities including lung cancer, avascular necrosis of both hips, status post surgeries, diabetes mellitus type 2, neuropathy, chronic alcoholic pancreatitis among others.  He lives alone.  He denies depression or anxiety.  He smokes.  He has history of heavy alcohol use in the past and he tells me that he has not been drinking for the last 4 years.    PLAN:  I discussed with Mr. Hernandez about possible etiology of his symptoms and different treatment options.    I shared the x-ray images with him.  I assured him that the x-rays of neck did not show any significant abnormality other than diffuse age-related changes.  I also assured him that physical examination did not reveal any significant abnormality that could be related to cervical spine.  Symptoms are likely related to cervical spondylosis.  I believe, he should be treated symptomatically for this chronic pain.  I offered him different treatment modalities including chiropractic, acupuncture, and massage and he is interested in trying chiropractic at this point and  we will arrange.  For decreased abduction of left shoulder, I offered him referral to orthopedic shoulder specialist and he wants to wait at this time.  He tells me that there is not much pain in his left shoulder other than some pain coming from his neck to both shoulders.  For chronic pain management, I recommended referral to our non interventional pain clinic.  I also offered him referral to Pain Psychology, which he declined.  He will follow up with me after chiropractic treatments.  If there is no improvement of symptoms, MRI of cervical spine will be considered, if clinically indicated.  He has history of lung cancer and he tells me that it was diagnosed earlier this year. I spent about 45 minutes of time with the patient, the majority of which was in face to face discussion, including explanation of possible etiology of symptoms, different treatment options and management of care.      PLAN REFERRED CLINIC: A copy of the consultation note has been sent to the requesting provider Izaiah Liriano MD. Thank you for allowing me to take part in the care of Mr. Hernandez. If you have any questions, please feel free to contact me.     Program status: Continue in [facility] Sp[facility] Program       Alert and oriented, no focal deficits, no motor or sensory deficits.

## 2020-10-26 ENCOUNTER — NON-APPOINTMENT (OUTPATIENT)
Age: 50
End: 2020-10-26

## 2020-11-28 ENCOUNTER — OUTPATIENT (OUTPATIENT)
Dept: OUTPATIENT SERVICES | Facility: HOSPITAL | Age: 50
LOS: 1 days | Discharge: HOME | End: 2020-11-28

## 2020-11-28 DIAGNOSIS — C61 MALIGNANT NEOPLASM OF PROSTATE: ICD-10-CM

## 2020-11-30 LAB
ALBUMIN SERPL ELPH-MCNC: 4.2 G/DL
ALP BLD-CCNC: 105 U/L
ALT SERPL-CCNC: 16 U/L
ANION GAP SERPL CALC-SCNC: 9 MMOL/L
AST SERPL-CCNC: 9 U/L
BASOPHILS # BLD AUTO: 0.03 K/UL
BASOPHILS NFR BLD AUTO: 0.6 %
BILIRUB SERPL-MCNC: 0.3 MG/DL
BUN SERPL-MCNC: 22 MG/DL
CALCIUM SERPL-MCNC: 9.4 MG/DL
CHLORIDE SERPL-SCNC: 104 MMOL/L
CO2 SERPL-SCNC: 25 MMOL/L
CREAT SERPL-MCNC: 0.72 MG/DL
EOSINOPHIL # BLD AUTO: 0.17 K/UL
EOSINOPHIL NFR BLD AUTO: 3.6 %
GLUCOSE SERPL-MCNC: 95 MG/DL
HCT VFR BLD CALC: 40 %
HGB BLD-MCNC: 13.6 G/DL
IMM GRANULOCYTES NFR BLD AUTO: 0.6 %
LYMPHOCYTES # BLD AUTO: 0.97 K/UL
LYMPHOCYTES NFR BLD AUTO: 20.5 %
MAN DIFF?: NORMAL
MCHC RBC-ENTMCNC: 30.1 PG
MCHC RBC-ENTMCNC: 34 GM/DL
MCV RBC AUTO: 88.5 FL
MONOCYTES # BLD AUTO: 0.5 K/UL
MONOCYTES NFR BLD AUTO: 10.6 %
NEUTROPHILS # BLD AUTO: 3.03 K/UL
NEUTROPHILS NFR BLD AUTO: 64.1 %
PLATELET # BLD AUTO: 206 K/UL
POTASSIUM SERPL-SCNC: 4.3 MMOL/L
PROT SERPL-MCNC: 6.8 G/DL
PSA SERPL-MCNC: <0.01 NG/ML
RBC # BLD: 4.52 M/UL
RBC # FLD: 12.8 %
SODIUM SERPL-SCNC: 138 MMOL/L
WBC # FLD AUTO: 4.73 K/UL

## 2020-12-02 ENCOUNTER — APPOINTMENT (OUTPATIENT)
Dept: HEMATOLOGY ONCOLOGY | Facility: CLINIC | Age: 50
End: 2020-12-02
Payer: COMMERCIAL

## 2020-12-02 ENCOUNTER — APPOINTMENT (OUTPATIENT)
Dept: HEMATOLOGY ONCOLOGY | Facility: CLINIC | Age: 50
End: 2020-12-02

## 2020-12-02 PROCEDURE — 99214 OFFICE O/P EST MOD 30 MIN: CPT | Mod: 95

## 2020-12-02 NOTE — HISTORY OF PRESENT ILLNESS
[Home] : at home, [unfilled] , at the time of the visit. [Medical Office: ([facility])___] : at the medical office located in  [Verbal consent obtained from patient] : the patient, [unfilled] [de-identified] : Jesus Fisher is seen in consultation on February 18, 2020. As previously followed at [facility] by Dr Beni Oliver. 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 He's been on androgen deprivation therapy since April of 2018. His last Lupron injection was on January 15, 2020 receiving a three-month injection. He received external beam radiation therapy to the right acetabulum to the prostate bed and pelvis. At the time of his last visit he was on abiraterone and prednisone since February 2019. The notes indicate that he developed a rash a few days after degaralix and he received zoledronic acid at the same time. [facility] [place] labeled as possible allergy to zoledronic acid and to degaralix.\par \par On November 7, 2018, his PSA was 15.8. One week later he underwent prostate biopsy showing Mobile 7 disease (4+3) with intraductal carcinoma present. Staging at that time revealed metastases. On April 3 of 2018 he had a core biopsy of the acetabulum which revealed metastatic prostate cancer. He underwent a radical prostatectomy on October of 2018 after 6 months of Lupron treatment. This revealed Mobile 8 tumor (4+4) with extracapsular extension and seminal vesicle involvement,[id number]([id number]. On January 29 of 2019 he started external beam radiation therapy to the prostate and pelvis to a total of 7660 cGy. This continued until March 21, 2019. He was started on abiraterone and prednisone in February 1, 2019. From February 12, 2019 14 he received external beam radiation therapy to the right femur to a total dose of 2700 cGy.\par \par PSA data indicates undetectable values from March 11, 2019 into January 15, 2020. An MRI of the pelvis was performed on September 10 of 2019 in followup and there were no new osseous metastases. There was prominent diffusely asymmetric obturator thickening and enhancement on the right in the superior iliac crest. Given the undetectable PSA, this was felt to be post treatment related and less likely malignant infiltration. A short-term followup was recommended. The summary note indicates that the PSA has been unmeasurable since December of 2018. Given the operative pathology, he received adjuvant external beam radiation therapy in addition to treatment of the right acetabulum. He had microsatellite stability with no somatic mutations on IMPACT testing from the right acetabular biopsy. He has a germline BRIP-1 mutation.\par \par His prior oncologist I discussed a minimum of one year duration of abiraterone and 2 years of androgen deprivation therapy. Although he noted that this was not standard of care. The note indicates that the January 15 would be his last planned ADT dosing.\par His last DEXA was in March 2019, demonstrating osteoporosis, but improved. The note indicates that there was stable subcentimeter lung nodules and a CT scan from December of 2018.\par The prostate biopsy revealed 5 cores a Mobile 7 (4+3), and 6 cores of Mobile 7, (3+4). Intraductal carcinoma was present in one core. The CT scan of the abdomen and pelvis revealed pelvic adenopathy concerning for metastatic disease. Lung nodules and indeterminate liver lesions were noted. A DEXA scan on March of 2018 showed osteoporosis. The CT scan of the chest showed small big sclerotic densities in the rib cage including a larger lesion corresponding to the site of mild uptake on recent PET. The radiologist that metastases could not be excluded.\par A PET/CT was performed on March 3, 2018. Normal foci of increased FDG uptake in the prostate, with FDG avid the bilateral iliac lymph nodes and faint uptake in the right acetabulum, read as consistent with osseous metastases. Mild metabolic mixed density in the posterior eighth rib, indeterminate. Pulmonary nodules were not avid. MRI on March 11 did not reveal any lesions within the liver. The original consultation records he had a diffuse body rash after receiving degaralix and denosumab.\par \par Feels well. No pains. Urine flow is good, nocturia x 4-6. Occ incontinence. No urgency, no blood, no dysuria. No edema of the legs. Infrequent hot flushes. 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 has used Trimix injections wih benefit. Wife now diagnosed with breast cancer.\par \par 4/21/20...tele-services...patient granted verbal permission for tele-services on 4/21/20 at 12:25 PM.\par \par He had right hip and groin pain recently and was prescribed oxycodone, took it for 3 days, pain resolved. Has acetabular lesion that was previously radiated along with prostate at [facility] [place]. Feel well now. Occ hot flushes, no fatigue. Appetite is good, no weight loss. Urine flow is good, nocturia x 2-3. No dysuria, no hematuria, no incontinence. Libido poor, no erections of note, was to see urology about intracavernous injections, was not able to schedule as yet due tot he coronavirus crisis. No edema. Does not have BP cuff at home. No N/V/D/C. \par \par 7/21/20...Last seen in April via TEB. Taking abiraterone at 6 am on an empty stomach and prednisone once a day. Feels "okay." Appetite is good, feels like he gained weight and has been trying to lose weight. No dyspepsia, nausea, or vomiting. No constipation or diarrheal stools. No headaches or dizziness. No cough or BARAJAS. No skin rashes or pruritus. No pains noted. No significant fatigue. No weakness. Working full-time without any problems. Urine flow is good, nocturia x 4-6. No hematuria or dysuria. Occ incontinence, waking up with soaked underwear, no adult diapers or pads used. Libido is poor, no erections. Hasn't seen urologist, not comfortable with the one he has and seeking for a new one, has not started on intracavernous injections. Infrequent hot flashes with sweats. No edema. Reports that he fell last night, missed a step, landed on his left arm, did not need to go to the ED.  \par \par 10/21/20...Feels "all right". 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 He has occasional hot flushes. Appetite is good, weight is stable. urine flow is good, nocturia x 3-4. No blood, no incontinence, no dysuria. No urgency. has not used the intracavernal injections, had good erection in the MDs office, but does not have much of libido. No edema, no headaches. No fatigue  noted.  [de-identified] : Cherrie 8 at prostatectomy, GG 4 [de-identified] : PSA 15.8 at diagnosis, with mets to bone and LNs [de-identified] : prostatectomy after 6 months ADT\par post op RT to prostate and right acetabulum [FreeTextEntry1] : Treated with hormonal therapy for 6 months, underwent prostatectomy. Biopsied acetabulum revealed metastatic disease to bone. Underwent radiation therapy post prostatectomy. Started abiraterone and prednisone in February of 2019. Oncologist stopped both the abiraterone and prednisone in January 2020 and recommended discontinuation of the Lupron with the last injection of the three-month Lupron in January 15, 2020. [de-identified] : Feels "good." Appetite is good, no subjective weight loss. No dyspepsia, nausea or vomiting. No constipation or diarrheal stools. No headaches or dizziness. No cough or BARAJAS. No skin rashes or pruritus. No pains noted. No significant fatigue, still working full time. Urine flow is good, nocturia x 2-3. No hematuria, dysuria, incontinence or urgency. Occ hot flushes with sweats, lasts for about 2 minutes. No edema. No fevers or chills. Independent in ADLs. \par \par

## 2020-12-02 NOTE — ASSESSMENT
[FreeTextEntry1] : Mr. Fisher is seen via telehealth services today. He states that he feels "good." His appetite has been good, and there is no subjective weight loss. There is no dyspepsia, nausea or vomiting. There is no constipation or diarrheal stools. There are no headaches or dizziness. There is no cough or shortness of breath. There are no skin rashes or pruritus. There are no pains noted. There is no significant fatigue, and he continues to work full-time. His urinary flow is stated to be good. He has nocturia occurring 2-3 times per night. There is no hematuria, dysuria, incontinence or urgency. He experiences occasional hot flushes with sweats. They lasts for about 2 minutes. There is no leg edema. There are no fevers or chills. He remains independent in his activities of daily living. \par \par His ECOG Performance Score is 0. \par \par His PSA was 15.8 on November 7 of 2018. He was found to have Cherrie 8 tumor with bone and lymph node metastasis. He underwent a prostatectomy followed by radiation therapy. He also received radiation therapy to a lesion within his right femur. He was started on abiraterone and prednisone in February 1, 2019. His PSA has been undetectable since March 11 of 2019.  \par \par We reviewed the results of his blood work done on November 27. His PSA remains undetectable. The complete blood count and chemistry panel were all within normal limits. \par \par He is due for his Eligard next month. This has been scheduled already and a follow-up office visit will be conducted on the same day of the injection. All questions were answered to the best of my ability and to his apparent satisfaction.

## 2020-12-02 NOTE — REVIEW OF SYSTEMS
[Muscle Pain] : muscle pain [Hot Flashes] : hot flashes [Negative] : ENT [Fever] : no fever [Chills] : no chills [Fatigue] : no fatigue [Recent Change In Weight] : ~T no recent weight change [Chest Pain] : no chest pain [Palpitations] : no palpitations [Lower Ext Edema] : no lower extremity edema [Cough] : no cough [SOB on Exertion] : no shortness of breath during exertion [Vomiting] : no vomiting [Constipation] : no constipation [Diarrhea] : no diarrhea [Dysuria] : no dysuria [Incontinence] : no incontinence [Joint Stiffness] : no joint stiffness [Skin Rash] : no skin rash [Dizziness] : no dizziness [Difficulty Walking] : no difficulty walking [Anxiety] : no anxiety [Depression] : no depression [Muscle Weakness] : no muscle weakness [Easy Bleeding] : no tendency for easy bleeding [Easy Bruising] : no tendency for easy bruising [FreeTextEntry2] : appetite is good [FreeTextEntry7] : no nausea [FreeTextEntry8] : no hematuria [FreeTextEntry9] : leg pain, chronic [de-identified] : no pruritus [de-identified] : no HA, no paresthesias

## 2021-01-06 NOTE — ED CDU PROVIDER INITIAL DAY NOTE - PROGRESS NOTE ADDITIONAL3
pt with left hip pain s/p fall.  CK trending down. Pt with anemia   - Pain is generalized but mainly on right side of body  -Nonopioid Recommendations  - Avoid NSAIDs due to anemia  - Continue gabapentin to 200mg po q 12 hours  - Reorder acetaminophen 1000gram po q 8 hours for 3 days  opioid recommendation  - tramadol 25mg po q8h prn severe pain. Monitor for respiratory depression/ sedation  Bowel Regimen   - miralax. Additional Progress Note...

## 2021-01-22 ENCOUNTER — OUTPATIENT (OUTPATIENT)
Dept: OUTPATIENT SERVICES | Facility: HOSPITAL | Age: 51
LOS: 1 days | Discharge: HOME | End: 2021-01-22

## 2021-01-22 DIAGNOSIS — C61 MALIGNANT NEOPLASM OF PROSTATE: ICD-10-CM

## 2021-01-27 ENCOUNTER — APPOINTMENT (OUTPATIENT)
Dept: INFUSION THERAPY | Facility: HOSPITAL | Age: 51
End: 2021-01-27

## 2021-01-27 ENCOUNTER — APPOINTMENT (OUTPATIENT)
Dept: HEMATOLOGY ONCOLOGY | Facility: CLINIC | Age: 51
End: 2021-01-27

## 2021-02-10 ENCOUNTER — NON-APPOINTMENT (OUTPATIENT)
Age: 51
End: 2021-02-10

## 2021-02-10 ENCOUNTER — APPOINTMENT (OUTPATIENT)
Dept: INFUSION THERAPY | Facility: HOSPITAL | Age: 51
End: 2021-02-10

## 2021-02-10 ENCOUNTER — RESULT REVIEW (OUTPATIENT)
Age: 51
End: 2021-02-10

## 2021-02-10 LAB
ALBUMIN SERPL ELPH-MCNC: 4.5 G/DL
ALP BLD-CCNC: 114 U/L
ALT SERPL-CCNC: 17 U/L
ANION GAP SERPL CALC-SCNC: 13 MMOL/L
AST SERPL-CCNC: 13 U/L
BASOPHILS # BLD AUTO: 0.03 K/UL — SIGNIFICANT CHANGE UP (ref 0–0.2)
BASOPHILS NFR BLD AUTO: 0.7 % — SIGNIFICANT CHANGE UP (ref 0–2)
BILIRUB SERPL-MCNC: 0.3 MG/DL
BUN SERPL-MCNC: 18 MG/DL
CALCIUM SERPL-MCNC: 9.8 MG/DL
CHLORIDE SERPL-SCNC: 102 MMOL/L
CO2 SERPL-SCNC: 22 MMOL/L
CREAT SERPL-MCNC: 0.91 MG/DL
EOSINOPHIL # BLD AUTO: 0.18 K/UL — SIGNIFICANT CHANGE UP (ref 0–0.5)
EOSINOPHIL NFR BLD AUTO: 4 % — SIGNIFICANT CHANGE UP (ref 0–6)
GLUCOSE SERPL-MCNC: 92 MG/DL
HCT VFR BLD CALC: 41.3 % — SIGNIFICANT CHANGE UP (ref 39–50)
HGB BLD-MCNC: 14 G/DL — SIGNIFICANT CHANGE UP (ref 13–17)
IMM GRANULOCYTES NFR BLD AUTO: 0.4 % — SIGNIFICANT CHANGE UP (ref 0–1.5)
LDH SERPL-CCNC: 214 U/L
LYMPHOCYTES # BLD AUTO: 0.91 K/UL — LOW (ref 1–3.3)
LYMPHOCYTES # BLD AUTO: 20 % — SIGNIFICANT CHANGE UP (ref 13–44)
MCHC RBC-ENTMCNC: 29.4 PG — SIGNIFICANT CHANGE UP (ref 27–34)
MCHC RBC-ENTMCNC: 33.9 G/DL — SIGNIFICANT CHANGE UP (ref 32–36)
MCV RBC AUTO: 86.6 FL — SIGNIFICANT CHANGE UP (ref 80–100)
MONOCYTES # BLD AUTO: 0.48 K/UL — SIGNIFICANT CHANGE UP (ref 0–0.9)
MONOCYTES NFR BLD AUTO: 10.5 % — SIGNIFICANT CHANGE UP (ref 2–14)
NEUTROPHILS # BLD AUTO: 2.93 K/UL — SIGNIFICANT CHANGE UP (ref 1.8–7.4)
NEUTROPHILS NFR BLD AUTO: 64.4 % — SIGNIFICANT CHANGE UP (ref 43–77)
NRBC # BLD: 0 /100 WBCS — SIGNIFICANT CHANGE UP (ref 0–0)
PLATELET # BLD AUTO: 208 K/UL — SIGNIFICANT CHANGE UP (ref 150–400)
POTASSIUM SERPL-SCNC: 4.4 MMOL/L
PROT SERPL-MCNC: 7.2 G/DL
PSA SERPL-MCNC: <0.01 NG/ML
RBC # BLD: 4.77 M/UL — SIGNIFICANT CHANGE UP (ref 4.2–5.8)
RBC # FLD: 12.9 % — SIGNIFICANT CHANGE UP (ref 10.3–14.5)
SODIUM SERPL-SCNC: 138 MMOL/L
WBC # BLD: 4.55 K/UL — SIGNIFICANT CHANGE UP (ref 3.8–10.5)
WBC # FLD AUTO: 4.55 K/UL — SIGNIFICANT CHANGE UP (ref 3.8–10.5)

## 2021-02-23 ENCOUNTER — OUTPATIENT (OUTPATIENT)
Dept: OUTPATIENT SERVICES | Facility: HOSPITAL | Age: 51
LOS: 1 days | Discharge: HOME | End: 2021-02-23

## 2021-02-23 DIAGNOSIS — C61 MALIGNANT NEOPLASM OF PROSTATE: ICD-10-CM

## 2021-02-26 ENCOUNTER — APPOINTMENT (OUTPATIENT)
Dept: HEMATOLOGY ONCOLOGY | Facility: CLINIC | Age: 51
End: 2021-02-26
Payer: COMMERCIAL

## 2021-02-26 PROCEDURE — 99213 OFFICE O/P EST LOW 20 MIN: CPT | Mod: 95

## 2021-02-26 NOTE — ASSESSMENT
[FreeTextEntry1] : Jesus was last seen on December 2.  He was due to see me in January, but he did not keep his.  He has been on abiraterone and prednisone since January 2020.  He reports that he feels well.  There is no pain at this time.  Urinary flow is good.  Nocturia is variable occurring anywhere from 1-4 times at night.  He does have occasional leakage at night.  There is no hematuria dysuria.  He does not wear a pad.  He does not experience hot flashes.  There is no fatigue.  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  There is no edema of the extremities.  There are no headaches.  There is no dizziness.  He denies chest pain or palpitations.  His appetite is good and there is no weight loss.  There are no respiratory complaints.  He does not have a blood pressure cuff\par \par His performance status is 0.  He is in no acute distress.\par \par He had laboratory test performed earlier this week.  His PSA was undetectable.  The comprehensive metabolic panel and CBC were normal\par \par He has been tolerating therapy very well.  His PSA was 15.8 at the time of his diagnosis.  He received 6 months of androgen deprivation therapy for both bone and lymph node metastases.  He then underwent a prostatectomy at [facility].  He had postoperative radiation therapy to the right prostate as well as to the right acetabulum.  He is being treated for hormone sensitive metastatic prostate cancer and has had an undetectable PSA for considerable time.  The right acetabulum was biopsied revealing confirmation of the diagnosis.\par \par All questions were answered to the best of my ability and to his apparent satisfaction.  I have asked him to perform another telehealth visit in 6 weeks with laboratory values before that.

## 2021-02-26 NOTE — HISTORY OF PRESENT ILLNESS
[de-identified] : Jesus Fisher is seen in consultation on February 18, 2020. As previously followed at [facility] by Dr Beni Oliver. 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 He's been on androgen deprivation therapy since April of 2018. His last Lupron injection was on January 15, 2020 receiving a three-month injection. He received external beam radiation therapy to the right acetabulum to the prostate bed and pelvis. At the time of his last visit he was on abiraterone and prednisone since February 2019. The notes indicate that he developed a rash a few days after degaralix and he received zoledronic acid at the same time. [facility] labeled as possible allergy to zoledronic acid and to degaralix.\par \par On November 7, 2018, his PSA was 15.8. One week later he underwent prostate biopsy showing Odessa 7 disease (4+3) with intraductal carcinoma present. Staging at that time revealed metastases. On April 3 of 2018 he had a core biopsy of the acetabulum which revealed metastatic prostate cancer. He underwent a radical prostatectomy on October of 2018 after 6 months of Lupron treatment. This revealed Odessa 8 tumor (4+4) with extracapsular extension and seminal vesicle involvement,ypT3b(R1)N1M1b. On January 29 of 2019 he started external beam radiation therapy to the prostate and pelvis to a total of 7660 cGy. This continued until March 21, 2019. He was started on abiraterone and prednisone in February 1, 2019. From February 12, 2019 14 he received external beam radiation therapy to the right femur to a total dose of 2700 cGy.\par \par PSA data indicates undetectable values from March 11, 2019 into January 15, 2020. An MRI of the pelvis was performed on September 10 of 2019 in followup and there were no new osseous metastases. There was prominent diffusely asymmetric obturator thickening and enhancement on the right in the superior iliac crest. Given the undetectable PSA, this was felt to be post treatment related and less likely malignant infiltration. A short-term followup was recommended. The summary note indicates that the PSA has been unmeasurable since December of 2018. Given the operative pathology, he received adjuvant external beam radiation therapy in addition to treatment of the right acetabulum. He had microsatellite stability with no somatic mutations on IMPACT testing from the right acetabular biopsy. He has a germline BRIP-1 mutation.\par \par His prior oncologist I discussed a minimum of one year duration of abiraterone and 2 years of androgen deprivation therapy. Although he noted that this was not standard of care. The note indicates that the January 15 would be his last planned ADT dosing.\par His last DEXA was in March 2019, demonstrating osteoporosis, but improved. The note indicates that there was stable subcentimeter lung nodules and a CT scan from December of 2018.\par The prostate biopsy revealed 5 cores a Odessa 7 (4+3), and 6 cores of Odessa 7, (3+4). Intraductal carcinoma was present in one core. The CT scan of the abdomen and pelvis revealed pelvic adenopathy concerning for metastatic disease. Lung nodules and indeterminate liver lesions were noted. A DEXA scan on March of 2018 showed osteoporosis. The CT scan of the chest showed small big sclerotic densities in the rib cage including a larger lesion corresponding to the site of mild uptake on recent PET. The radiologist that metastases could not be excluded.\par A PET/CT was performed on March 3, 2018. Normal foci of increased FDG uptake in the prostate, with FDG avid the bilateral iliac lymph nodes and faint uptake in the right acetabulum, read as consistent with osseous metastases. Mild metabolic mixed density in the posterior eighth rib, indeterminate. Pulmonary nodules were not avid. MRI on March 11 did not reveal any lesions within the liver. The original consultation records he had a diffuse body rash after receiving degaralix and denosumab.\par \par Feels well. No pains. Urine flow is good, nocturia x 4-6. Occ incontinence. No urgency, no blood, no dysuria. No edema of the legs. Infrequent hot flushes. 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 has used Trimix injections wih benefit. Wife now diagnosed with breast cancer.\par \par 4/21/20...tele-services...patient granted verbal permission for tele-services on 4/21/20 at 12:25 PM.\par \par He had right hip and groin pain recently and was prescribed oxycodone, took it for 3 days, pain resolved. Has acetabular lesion that was previously rad[place] along with [place] [facility]. Feel well now. Occ hot flushes, no fatigue. Appetite is good, no weight loss. Urine flow is good, nocturia x 2-3. No dysuria, no hematuria, no incontinence. Libido poor, no erections of note, was to see urology about intracavernous injections, was not able to schedule as yet due tot he coronavirus crisis. No edema. Does not have BP cuff at home. No N/V/D/C. \par \par 7/21/20...Last seen in April via TEB. Taking abiraterone at 6 am on an empty stomach and prednisone once a day. Feels "okay." Appetite is good, feels like he gained weight and has been trying to lose weight. No dyspepsia, nausea, or vomiting. No constipation or diarrheal stools. No headaches or dizziness. No cough or BARAJAS. No skin rashes or pruritus. No pains noted. No significant fatigue. No weakness. Working full-time without any problems. Urine flow is good, nocturia x 4-6. No hematuria or dysuria. Occ incontinence, waking up with soaked underwear, no adult diapers or pads used. Libido is poor, no erections. Hasn't seen urologist, not comfortable with the one he has and seeking for a new one, has not started on intracavernous injections. Infrequent hot flashes with sweats. No edema. Reports that he fell last night, missed a step, landed on his left arm, did not need to go to the ED.  \par \par 10/21/20...Feels "all right". 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 He has occasional hot flushes. Appetite is good, weight is stable. urine flow is good, nocturia x 3-4. No blood, no incontinence, no dysuria. No urgency. has not used the intracavernal injections, had good erection in the MDs office, but does not have much of libido. No edema, no headaches. No fatigue  noted. \par \par 12/2/20...Feels "good." Appetite is good, no subjective weight loss. No dyspepsia, nausea or vomiting. No constipation or diarrheal stools. No headaches or dizziness. No cough or BARAJAS. No skin rashes or pruritus. No pains noted. No significant fatigue, still working full time. Urine flow is good, nocturia x 2-3. No hematuria, dysuria, incontinence or urgency. Occ hot flushes with sweats, lasts for about 2 minutes. No edema. No fevers or chills. Independent in ADLs. \par  [de-identified] : Cherrie 8 at prostatectomy, GG 4 [de-identified] : PSA 15.8 at diagnosis, with mets to bone and LNs [de-identified] : prostatectomy after 6 months ADT\par post op RT to prostate and right acetabulum [FreeTextEntry1] : Treated with hormonal therapy for 6 months, underwent prostatectomy. Biopsied acetabulum revealed metastatic disease to bone. Underwent radiation therapy post prostatectomy. Started abiraterone and prednisone in February of 2019. Oncologist stopped both the abiraterone and prednisone in January 2020 and recommended discontinuation of the Lupron with the last injection of the three-month Lupron in January 15, 2020. [de-identified] : On lupillo/pred since January 2020. Feels well, no pains noted. Urine flow is good, nocturia x 1-4, occ leakage at night. NO blood, no dysuria. No pads. Hot flushes do not occur.  NO fatigue. NO edema. NO headaches, no dizziness. No CP, palpitations. Appetite is good, no weight loss. No cough, no BARAJAS

## 2021-02-26 NOTE — REVIEW OF SYSTEMS
[Incontinence] : incontinence [Negative] : Gastrointestinal [Fever] : no fever [Chills] : no chills [Fatigue] : no fatigue [Recent Change In Weight] : ~T no recent weight change [Chest Pain] : no chest pain [Palpitations] : no palpitations [Lower Ext Edema] : no lower extremity edema [Cough] : no cough [SOB on Exertion] : no shortness of breath during exertion [Dysuria] : no dysuria [Joint Pain] : no joint pain [Joint Stiffness] : no joint stiffness [Muscle Pain] : no muscle pain [Skin Rash] : no skin rash [Dizziness] : no dizziness [Anxiety] : no anxiety [Depression] : no depression [Hot Flashes] : no hot flashes [Muscle Weakness] : no muscle weakness [Easy Bruising] : no tendency for easy bruising [de-identified] : No HA, no paresthesias

## 2021-04-02 DIAGNOSIS — C61 MALIGNANT NEOPLASM OF PROSTATE: ICD-10-CM

## 2021-04-06 ENCOUNTER — APPOINTMENT (OUTPATIENT)
Dept: HEMATOLOGY ONCOLOGY | Facility: CLINIC | Age: 51
End: 2021-04-06

## 2022-07-15 ENCOUNTER — EMERGENCY (EMERGENCY)
Facility: HOSPITAL | Age: 52
LOS: 1 days | Discharge: HOME | End: 2022-07-15
Attending: EMERGENCY MEDICINE
Payer: COMMERCIAL

## 2022-07-15 VITALS
OXYGEN SATURATION: 99 % | WEIGHT: 279.99 LBS | DIASTOLIC BLOOD PRESSURE: 86 MMHG | HEART RATE: 61 BPM | TEMPERATURE: 99 F | HEIGHT: 71 IN | RESPIRATION RATE: 16 BRPM | SYSTOLIC BLOOD PRESSURE: 136 MMHG

## 2022-07-15 LAB
ALBUMIN SERPL ELPH-MCNC: 3.6 G/DL — SIGNIFICANT CHANGE UP (ref 3.3–5.2)
ALP SERPL-CCNC: 90 U/L — SIGNIFICANT CHANGE UP (ref 40–120)
ALT FLD-CCNC: 10 U/L — SIGNIFICANT CHANGE UP
ANION GAP SERPL CALC-SCNC: 10 MMOL/L — SIGNIFICANT CHANGE UP (ref 5–17)
AST SERPL-CCNC: 9 U/L — SIGNIFICANT CHANGE UP
BASOPHILS # BLD AUTO: 0.02 K/UL — SIGNIFICANT CHANGE UP (ref 0–0.2)
BASOPHILS NFR BLD AUTO: 0.2 % — SIGNIFICANT CHANGE UP (ref 0–2)
BILIRUB SERPL-MCNC: 0.5 MG/DL — SIGNIFICANT CHANGE UP (ref 0.4–2)
BUN SERPL-MCNC: 17.1 MG/DL — SIGNIFICANT CHANGE UP (ref 8–20)
CALCIUM SERPL-MCNC: 7.5 MG/DL — LOW (ref 8.6–10.2)
CHLORIDE SERPL-SCNC: 107 MMOL/L — SIGNIFICANT CHANGE UP (ref 98–107)
CO2 SERPL-SCNC: 20 MMOL/L — LOW (ref 22–29)
CREAT SERPL-MCNC: 0.53 MG/DL — SIGNIFICANT CHANGE UP (ref 0.5–1.3)
CRP SERPL-MCNC: 76 MG/L — HIGH
EGFR: 121 ML/MIN/1.73M2 — SIGNIFICANT CHANGE UP
EOSINOPHIL # BLD AUTO: 0.08 K/UL — SIGNIFICANT CHANGE UP (ref 0–0.5)
EOSINOPHIL NFR BLD AUTO: 0.7 % — SIGNIFICANT CHANGE UP (ref 0–6)
ERYTHROCYTE [SEDIMENTATION RATE] IN BLOOD: 40 MM/HR — HIGH (ref 0–20)
GLUCOSE SERPL-MCNC: 98 MG/DL — SIGNIFICANT CHANGE UP (ref 70–99)
HCT VFR BLD CALC: 38 % — LOW (ref 39–50)
HGB BLD-MCNC: 13.2 G/DL — SIGNIFICANT CHANGE UP (ref 13–17)
IMM GRANULOCYTES NFR BLD AUTO: 0.7 % — SIGNIFICANT CHANGE UP (ref 0–1.5)
LYMPHOCYTES # BLD AUTO: 1.13 K/UL — SIGNIFICANT CHANGE UP (ref 1–3.3)
LYMPHOCYTES # BLD AUTO: 9.3 % — LOW (ref 13–44)
MCHC RBC-ENTMCNC: 30.4 PG — SIGNIFICANT CHANGE UP (ref 27–34)
MCHC RBC-ENTMCNC: 34.7 GM/DL — SIGNIFICANT CHANGE UP (ref 32–36)
MCV RBC AUTO: 87.6 FL — SIGNIFICANT CHANGE UP (ref 80–100)
MONOCYTES # BLD AUTO: 1.03 K/UL — HIGH (ref 0–0.9)
MONOCYTES NFR BLD AUTO: 8.5 % — SIGNIFICANT CHANGE UP (ref 2–14)
NEUTROPHILS # BLD AUTO: 9.79 K/UL — HIGH (ref 1.8–7.4)
NEUTROPHILS NFR BLD AUTO: 80.6 % — HIGH (ref 43–77)
PLATELET # BLD AUTO: 193 K/UL — SIGNIFICANT CHANGE UP (ref 150–400)
POTASSIUM SERPL-MCNC: 3.3 MMOL/L — LOW (ref 3.5–5.3)
POTASSIUM SERPL-SCNC: 3.3 MMOL/L — LOW (ref 3.5–5.3)
PROT SERPL-MCNC: 6.1 G/DL — LOW (ref 6.6–8.7)
RBC # BLD: 4.34 M/UL — SIGNIFICANT CHANGE UP (ref 4.2–5.8)
RBC # FLD: 13.3 % — SIGNIFICANT CHANGE UP (ref 10.3–14.5)
SARS-COV-2 RNA SPEC QL NAA+PROBE: SIGNIFICANT CHANGE UP
SODIUM SERPL-SCNC: 137 MMOL/L — SIGNIFICANT CHANGE UP (ref 135–145)
WBC # BLD: 12.13 K/UL — HIGH (ref 3.8–10.5)
WBC # FLD AUTO: 12.13 K/UL — HIGH (ref 3.8–10.5)

## 2022-07-15 PROCEDURE — 73130 X-RAY EXAM OF HAND: CPT | Mod: 26,RT

## 2022-07-15 PROCEDURE — 99220: CPT

## 2022-07-15 RX ORDER — AMPICILLIN SODIUM AND SULBACTAM SODIUM 250; 125 MG/ML; MG/ML
3 INJECTION, POWDER, FOR SUSPENSION INTRAMUSCULAR; INTRAVENOUS EVERY 6 HOURS
Refills: 0 | Status: DISCONTINUED | OUTPATIENT
Start: 2022-07-15 | End: 2022-07-20

## 2022-07-15 RX ORDER — OXYCODONE HYDROCHLORIDE 5 MG/1
5 TABLET ORAL EVERY 6 HOURS
Refills: 0 | Status: DISCONTINUED | OUTPATIENT
Start: 2022-07-15 | End: 2022-07-15

## 2022-07-15 RX ORDER — POTASSIUM CHLORIDE 20 MEQ
40 PACKET (EA) ORAL ONCE
Refills: 0 | Status: COMPLETED | OUTPATIENT
Start: 2022-07-15 | End: 2022-07-15

## 2022-07-15 RX ORDER — AMPICILLIN SODIUM AND SULBACTAM SODIUM 250; 125 MG/ML; MG/ML
3 INJECTION, POWDER, FOR SUSPENSION INTRAMUSCULAR; INTRAVENOUS ONCE
Refills: 0 | Status: COMPLETED | OUTPATIENT
Start: 2022-07-15 | End: 2022-07-15

## 2022-07-15 RX ORDER — ACETAMINOPHEN 500 MG
650 TABLET ORAL EVERY 6 HOURS
Refills: 0 | Status: DISCONTINUED | OUTPATIENT
Start: 2022-07-15 | End: 2022-07-20

## 2022-07-15 RX ADMIN — AMPICILLIN SODIUM AND SULBACTAM SODIUM 3 GRAM(S): 250; 125 INJECTION, POWDER, FOR SUSPENSION INTRAMUSCULAR; INTRAVENOUS at 18:16

## 2022-07-15 RX ADMIN — Medication 40 MILLIEQUIVALENT(S): at 18:35

## 2022-07-15 RX ADMIN — AMPICILLIN SODIUM AND SULBACTAM SODIUM 200 GRAM(S): 250; 125 INJECTION, POWDER, FOR SUSPENSION INTRAMUSCULAR; INTRAVENOUS at 17:46

## 2022-07-15 NOTE — ED CDU PROVIDER INITIAL DAY NOTE - ATTENDING APP SHARED VISIT CONTRIBUTION OF CARE
I, Andres Gonzalez, have personally performed a face to face diagnostic evaluation on this patient. I have reviewed the FELICIA note and agree with the history, exam and plan of care, except as noted.    51 yo M p/w right hand swelling and erythema. placed in Obs for IV abx for cellulitis .

## 2022-07-15 NOTE — ED STATDOCS - OBJECTIVE STATEMENT
53 y/o male with PMHx of Adenocarcinoma and Prostate cancer in process of eval for mets to lungs and liver presents to ED for hand pain/ injury. Pt has pain and swelling in his right hand since yesterday, went to UC where he was prescribed Indomethacin but symptoms persisted and worsened. 53 y/o male with PMHx of Adenocarcinoma and Prostate cancer in process of eval for mets to lungs and liver presents to ED for hand pain/ injury. Pt has pain and swelling in his right hand since yesterday, went to [facility] where he was prescribed Indomethacin but symptoms persisted and worsened. Pt does not remember hurting his hand in any way. He reports no cuts. Pain started in his knuckles. No Hx of gout. Denies fever or other medical problems. Pt states his hand started as irritation in one small spot with some skin warmth.

## 2022-07-15 NOTE — ED CDU PROVIDER INITIAL DAY NOTE - OBJECTIVE STATEMENT
53yo M no reported pmhx presented to ED c/o R hand pain and redness x 2 days. Pt noticed pain and redness to dorsum of R hand over 3rd MCP Wednesday morning, progressed to dorsum of hand yesterday so went to [facility] where he was prescribed Indomethacin but symptoms persisted and worsened. No reporting redness to dorsum of hand extending past wrist. Pain with flexion/extension of wrist. Reports he had small cut/abrasion to knuckle about 2 weeks ago. No Hx of gout. Denies fever, chills, numbness, tingling, linear streaking, bug bites.

## 2022-07-15 NOTE — ED CDU PROVIDER INITIAL DAY NOTE - PHYSICAL EXAMINATION
Gen: No acute distress, non toxic  HENT: NCAT, Mucous membranes moist, Orophayrnx without exudates, uvula midline  Eyes: pink conjunctivae, EOMI, PERRL  CV: RRR, nl s1/s2.  Resp: CTAB, normal rate and effort  Neuro: A&O x 3, CN II-XII intact, sensorimotor intact without deficits   MSK: R hand TTP over erythematous area. +Painful ROM in wrist flexion/extension. dec finger flexion digits 2-5 2/2 pain. no pain with passive extension.   Vasc: Cap refill <2sec, Radial pulses 2+ b/l  Skin: Erythema and warmth to dorsum of R hand extending proximally past wrist.

## 2022-07-15 NOTE — ED ADULT TRIAGE NOTE - CHIEF COMPLAINT QUOTE
Pt with pain and swelling to right hand since yesterday. Went Urgent Care yesterday and was started on Indomethacin but states that it has worsened. Pt has no h/o gout

## 2022-07-15 NOTE — ED CDU PROVIDER INITIAL DAY NOTE - MEDICAL DECISION MAKING DETAILS
53yo M presenting with R hand cellulitis     #Cellulitis R hand  -IV unasyn q6h start 7/15  -Trend cbc  -Elevate RUE    #DVT ppx  -pt ambulatory, no A/C at this time

## 2022-07-15 NOTE — ED CDU PROVIDER INITIAL DAY NOTE - NS ED ATTENDING STATEMENT MOD
This was a shared visit with the FELICIA. I reviewed and verified the documentation and independently performed the documented:

## 2022-07-15 NOTE — ED ADULT NURSE NOTE - NSICDXFAMILYHX_GEN_ALL_CORE_FT
[facility]  Report of Inpatient Wound Care Consultation     Radha Tobin Patient Status:  Inpatient    1978 MRN [id number]   [facility] 5NW-A Attending No att. providers found   [facility] Day # 1 PCP Edgar Cruz MD --   7.3   --    ESRML  40*   --    --    --    --    --    --    PGLU   --    --    --   141*  176*   --   141*     ASSESSMENT/ RECOMMENDATIONS:  Patient has wound vac to left heel, with sutures present at superior and inferior aspects of foot.  Integra to FAMILY HISTORY:  No pertinent family history in first degree relatives

## 2022-07-15 NOTE — ED STATDOCS - NSICDXPASTMEDICALHX_GEN_ALL_CORE_FT
PAST MEDICAL HISTORY:  Adenocarcinoma     Prostate cancer in process of eval for mets to lungs and luver

## 2022-07-15 NOTE — ED CDU PROVIDER INITIAL DAY NOTE - DETAILS
IV abx for R hand cellulitis Price (Use Numbers Only, No Special Characters Or $): 025 Price (Use Numbers Only, No Special Characters Or $): 584

## 2022-07-15 NOTE — ED ADULT NURSE NOTE - OBJECTIVE STATEMENT
Patient presented to ED with Right hand swelling and warm to touch. Patient denies injury/trauma to the hand. Meds given, labs sent. No distress noted at this time.

## 2022-07-15 NOTE — ED STATDOCS - MUSCULOSKELETAL, MLM
range of motion is not limited and there is no muscle tenderness. Right hand swelling with redness, no open wounds.

## 2022-07-15 NOTE — ED CDU PROVIDER INITIAL DAY NOTE - NS ED ROS FT
Gen: denies fever, chills, fatigue, weight loss  Skin: +Erythema, +Warmth. denies rashes, laceration, bruising  HEENT: denies visual changes, ear pain, nasal congestion, throat pain  Respiratory: denies BARAJAS, SOB, cough, wheezing  Cardiovascular: denies chest pain, palpitations, diaphoresis, LE edema  GI: denies abdominal pain, n/v/d  : denies dysuria, frequency, urgency, bowel/bladder incontinence  MSK: +R hand pain, +R hand swelling. Denies back pain, neck pain  Neuro: denies headache, dizziness, weakness, numbness  Psych: denies anxiety, depression, SI/HI, visual/auditory hallucinations

## 2022-07-16 VITALS
DIASTOLIC BLOOD PRESSURE: 72 MMHG | TEMPERATURE: 99 F | RESPIRATION RATE: 17 BRPM | HEART RATE: 58 BPM | OXYGEN SATURATION: 95 % | SYSTOLIC BLOOD PRESSURE: 121 MMHG

## 2022-07-16 LAB
ALBUMIN SERPL ELPH-MCNC: 3.9 G/DL — SIGNIFICANT CHANGE UP (ref 3.3–5.2)
ALP SERPL-CCNC: 101 U/L — SIGNIFICANT CHANGE UP (ref 40–120)
ALT FLD-CCNC: 10 U/L — SIGNIFICANT CHANGE UP
ANION GAP SERPL CALC-SCNC: 10 MMOL/L — SIGNIFICANT CHANGE UP (ref 5–17)
AST SERPL-CCNC: 9 U/L — SIGNIFICANT CHANGE UP
BILIRUB SERPL-MCNC: 0.6 MG/DL — SIGNIFICANT CHANGE UP (ref 0.4–2)
BUN SERPL-MCNC: 18 MG/DL — SIGNIFICANT CHANGE UP (ref 8–20)
CALCIUM SERPL-MCNC: 8.9 MG/DL — SIGNIFICANT CHANGE UP (ref 8.6–10.2)
CHLORIDE SERPL-SCNC: 103 MMOL/L — SIGNIFICANT CHANGE UP (ref 98–107)
CO2 SERPL-SCNC: 23 MMOL/L — SIGNIFICANT CHANGE UP (ref 22–29)
CREAT SERPL-MCNC: 0.67 MG/DL — SIGNIFICANT CHANGE UP (ref 0.5–1.3)
EGFR: 112 ML/MIN/1.73M2 — SIGNIFICANT CHANGE UP
GLUCOSE SERPL-MCNC: 103 MG/DL — HIGH (ref 70–99)
HCT VFR BLD CALC: 38 % — LOW (ref 39–50)
HGB BLD-MCNC: 12.7 G/DL — LOW (ref 13–17)
MCHC RBC-ENTMCNC: 29.3 PG — SIGNIFICANT CHANGE UP (ref 27–34)
MCHC RBC-ENTMCNC: 33.4 GM/DL — SIGNIFICANT CHANGE UP (ref 32–36)
MCV RBC AUTO: 87.8 FL — SIGNIFICANT CHANGE UP (ref 80–100)
PLATELET # BLD AUTO: 163 K/UL — SIGNIFICANT CHANGE UP (ref 150–400)
POTASSIUM SERPL-MCNC: 4.3 MMOL/L — SIGNIFICANT CHANGE UP (ref 3.5–5.3)
POTASSIUM SERPL-SCNC: 4.3 MMOL/L — SIGNIFICANT CHANGE UP (ref 3.5–5.3)
PROT SERPL-MCNC: 7 G/DL — SIGNIFICANT CHANGE UP (ref 6.6–8.7)
RBC # BLD: 4.33 M/UL — SIGNIFICANT CHANGE UP (ref 4.2–5.8)
RBC # FLD: 13.5 % — SIGNIFICANT CHANGE UP (ref 10.3–14.5)
SODIUM SERPL-SCNC: 136 MMOL/L — SIGNIFICANT CHANGE UP (ref 135–145)
WBC # BLD: 9.35 K/UL — SIGNIFICANT CHANGE UP (ref 3.8–10.5)
WBC # FLD AUTO: 9.35 K/UL — SIGNIFICANT CHANGE UP (ref 3.8–10.5)

## 2022-07-16 PROCEDURE — 99284 EMERGENCY DEPT VISIT MOD MDM: CPT | Mod: 25

## 2022-07-16 PROCEDURE — 85027 COMPLETE CBC AUTOMATED: CPT

## 2022-07-16 PROCEDURE — 96366 THER/PROPH/DIAG IV INF ADDON: CPT

## 2022-07-16 PROCEDURE — 96365 THER/PROPH/DIAG IV INF INIT: CPT

## 2022-07-16 PROCEDURE — 36415 COLL VENOUS BLD VENIPUNCTURE: CPT

## 2022-07-16 PROCEDURE — 85652 RBC SED RATE AUTOMATED: CPT

## 2022-07-16 PROCEDURE — 99217: CPT

## 2022-07-16 PROCEDURE — U0005: CPT

## 2022-07-16 PROCEDURE — U0003: CPT

## 2022-07-16 PROCEDURE — 85025 COMPLETE CBC W/AUTO DIFF WBC: CPT

## 2022-07-16 PROCEDURE — 96376 TX/PRO/DX INJ SAME DRUG ADON: CPT

## 2022-07-16 PROCEDURE — 73130 X-RAY EXAM OF HAND: CPT

## 2022-07-16 PROCEDURE — 80053 COMPREHEN METABOLIC PANEL: CPT

## 2022-07-16 PROCEDURE — G0378: CPT

## 2022-07-16 PROCEDURE — 86140 C-REACTIVE PROTEIN: CPT

## 2022-07-16 RX ORDER — IBUPROFEN 200 MG
1 TABLET ORAL
Qty: 28 | Refills: 0
Start: 2022-07-16 | End: 2022-07-22

## 2022-07-16 RX ADMIN — AMPICILLIN SODIUM AND SULBACTAM SODIUM 200 GRAM(S): 250; 125 INJECTION, POWDER, FOR SUSPENSION INTRAMUSCULAR; INTRAVENOUS at 05:07

## 2022-07-16 RX ADMIN — AMPICILLIN SODIUM AND SULBACTAM SODIUM 3 GRAM(S): 250; 125 INJECTION, POWDER, FOR SUSPENSION INTRAMUSCULAR; INTRAVENOUS at 05:38

## 2022-07-16 RX ADMIN — AMPICILLIN SODIUM AND SULBACTAM SODIUM 200 GRAM(S): 250; 125 INJECTION, POWDER, FOR SUSPENSION INTRAMUSCULAR; INTRAVENOUS at 00:06

## 2022-07-16 RX ADMIN — AMPICILLIN SODIUM AND SULBACTAM SODIUM 3 GRAM(S): 250; 125 INJECTION, POWDER, FOR SUSPENSION INTRAMUSCULAR; INTRAVENOUS at 00:36

## 2022-07-16 RX ADMIN — Medication 40 MILLIGRAM(S): at 05:07

## 2022-07-16 RX ADMIN — AMPICILLIN SODIUM AND SULBACTAM SODIUM 200 GRAM(S): 250; 125 INJECTION, POWDER, FOR SUSPENSION INTRAMUSCULAR; INTRAVENOUS at 11:07

## 2022-07-16 NOTE — ED CDU PROVIDER SUBSEQUENT DAY NOTE - PROGRESS NOTE DETAILS
Cellulitis appears improved from yesterday. Pt able to make fist, reporting pain and swelling has decreased. will continue IV unasyn and re-assess after 12pm dose

## 2022-07-16 NOTE — ED CDU PROVIDER SUBSEQUENT DAY NOTE - MEDICAL DECISION MAKING DETAILS
53yo M presenting with R hand cellulitis     #Cellulitis R hand, site marked   -IV unasyn q6h start 7/15  -Trend cbc  -Elevate RUE

## 2022-07-16 NOTE — ED ADULT NURSE REASSESSMENT NOTE - NS ED NURSE REASSESS COMMENT FT1
Remains afebrile. IV abx infusing. Will re-eval response.
Pt A&O x 4 comfortable, denies complaints at this time. Reports pain tolerable. Bed locked and in lowest position. Call bell within reach. Able to make needs known. Will continue to monitor.
Assumed care of pt at this time. Pt A&O x 4 presents to ED with swelling of R hand x 2 days. Pt denies recent injury or bite. Pt has redness on R hand from 3rd knuckle going down to R wrist; denies pain. Denies fever or chills. Bed locked and in lowest position. Call bell within reach. Will continue to monitor.
Assumed care of the patient at 0730. Verbal report received from [facility]. Patient A&Ox4. Patient presents with Right hand swelling/erythema/warmth to touch. VSS, afebrile. PIV patent. Ambulatory with steady gait. Patient pending IV abx administration and re-eval. Patient in understanding of plan of care. Patient with no further questions for the RN. Resting in comfort. Call bell within reach and encouraged to use when assistance needed. Will continue to monitor.

## 2022-07-16 NOTE — ED CDU PROVIDER DISPOSITION NOTE - CARE PROVIDER_API CALL
Baldo Hummel)  Plastic Surgery Surgery  [address] [place] [address]  Phone: [phone]  Fax: [phone]  Follow Up Time:

## 2022-07-16 NOTE — ED CDU PROVIDER DISPOSITION NOTE - ATTENDING CONTRIBUTION TO CARE
I agree with the PA's note and was available for any issues/concerns. I was directly involved in patient care. My brief overall assessment is as follows:    symptoms improving; patient feels comfortable with discharge and medical plan; PMD or clinic follow up recommended for reassessment. Patient is aware of signs/symptoms to return to the emergency department.

## 2022-07-16 NOTE — ED CDU PROVIDER DISPOSITION NOTE - CLINICAL COURSE
51yo M no reported pmhx presented to ED c/o R hand pain and redness x 2 days. Tx for cellulitis in ED with IV unasyn. Kept in obs for IV abx. Received 4 doses IV unasyn while in obs with significant improvement in erythema and swelling. WBC downtrending this AM. provided rx for augmentin and f/u information for hand specialist. given copy of all results and educated on return precautions

## 2022-07-16 NOTE — ED CDU PROVIDER SUBSEQUENT DAY NOTE - ATTENDING APP SHARED VISIT CONTRIBUTION OF CARE
I agree with the PA's note and was available for any issues/concerns. I was directly involved in patient care. My brief overall assessment is as follows:    no acute events overnight; continue abx and reassessment

## 2022-07-16 NOTE — ED CDU PROVIDER SUBSEQUENT DAY NOTE - NS ED ROS FT
Gen: denies fever, chills, fatigue, weight loss  Skin: +Erythema, +Warmth. denies rashes, laceration, bruising  HEENT: denies visual changes, ear pain, nasal congestion, throat pain  Respiratory: denies BARAJAS, SOB, cough, wheezing  Cardiovascular: denies chest pain, palpitations, diaphoresis, LE edema  GI: denies abdominal pain, n/v/d  : denies dysuria, frequency, urgency, bowel/bladder incontinence  MSK: +R hand pain, +R hand swelling. Denies back pain, neck pain  Neuro: denies headache, dizziness, weakness, numbness  Psych: denies anxiety, depression, SI/HI, visual/auditory hallucinations Gen: denies fever, chills, fatigue, weight loss  Skin: +Erythema, +Warmth. denies rashes, laceration, bruising  HEENT: denies visual changes, ear pain, nasal congestion, throat pain  Respiratory: denies BARAJAS, SOB, cough, wheezing  Cardiovascular: denies chest pain, palpitations, diaphoresis, LE edema  GI: denies abdominal pain, n/v/d  : denies dysuria, frequency, urgency, bowel/bladder incontinence  MSK: +R hand pain, +R hand swelling. Denies back pain, neck pain  Neuro: denies headache, dizziness, weakness, numbness  Psych: denies anxiety, depression, SI/HI, visual/auditory hallucinations.

## 2022-07-16 NOTE — ED ADULT NURSE REASSESSMENT NOTE - COMFORT CARE
Refill Approved    Rx renewed per Medication Renewal Policy. Medication was last renewed on 12/25/19.    Renee Workman, [facility] Triage/Med Refill 4/2/2020     Requested Prescriptions   Pending Prescriptions Disp Refills     acyclovir (ZOVIRAX) 400 MG tablet [Pharmacy Med Name: ACYCLOVIR 400 MG TABLET] 30 tablet 1     Sig: TAKE 1 TABLET BY MOUTH 3 TIMES DAILY FOR 5-10 DAYS       Antivirals Refill Protocol Passed - 3/31/2020  8:04 PM        Passed - Renal function done in last year     Creatinine   Date Value Ref Range Status   07/29/2019 0.57 (L) 0.60 - 1.10 mg/dL Final             Passed - Visit with PCP or prescribing provider visit in past 12 months or next 3 months     Last office visit with prescriber/PCP: Visit date not found OR same dept: Visit date not found OR same specialty: 3/5/2020 Osman Palmer,   Last physical: 7/29/2019 Last MTM visit: Visit date not found   Next visit within 3 mo: Visit date not found  Next physical within 3 mo: Visit date not found  Prescriber OR PCP: Tiffany Lorenzo MD  Last diagnosis associated with med order: 1. Herpes simplex type 1 infection  - acyclovir (ZOVIRAX) 400 MG tablet [Pharmacy Med Name: ACYCLOVIR 400 MG TABLET]; TAKE 1 TABLET BY MOUTH 3 TIMES DAILY FOR 5-10 DAYS  Dispense: 30 tablet; Refill: 1    If protocol passes may refill for 12 months if within 3 months of last provider visit (or a total of 15 months).             Passed - Patient does not have active pregnancy episode        Passed - Patient has not had positive pregnancy test in last 280 days     No results found for: HCGQUAL, PREGTESTUR, PREGSERUM, BHCG                         
ambulated to bathroom/meal provided/plan of care explained/po fluids offered/repositioned/wait time explained
plan of care explained
ambulated to bathroom/meal provided/plan of care explained/po fluids offered/repositioned/wait time explained

## 2022-07-16 NOTE — ED CDU PROVIDER DISPOSITION NOTE - NSFOLLOWUPINSTRUCTIONS_ED_ALL_ED_FT
- Return to the ED for any new or worsening symptoms.   - Please complete course of antibiotics as directed  - Apply ice to affected area for 15-20 minutes every few hours for the next few days.  Use as much or as frequently as desired.   - May take tylenol 650mg every 6 hours and/or ibuprofen 600mg every 6 hours as needed for pain  - Elevate right hand while resting    Cellulitis    Cellulitis is a skin infection caused by bacteria. This condition occurs most often in the arms and lower legs but can occur anywhere over the body. Symptoms include redness, swelling, warm skin, tenderness, and chills/fever. If you were prescribed an antibiotic medicine, take it as told by your health care provider. Do not stop taking the antibiotic even if you start to feel better.    SEEK IMMEDIATE MEDICAL CARE IF YOU HAVE ANY OF THE FOLLOWING SYMPTOMS: worsening fever, red streaks coming from affected area, vomiting or diarrhea, or dizziness/lightheadedness.

## 2022-07-16 NOTE — ED CDU PROVIDER DISPOSITION NOTE - PATIENT PORTAL LINK FT
You can access the FollowMyHealth Patient Portal offered by [facility] by registering at the following website: [email or web address]. By joining [employer]’s FollowMyHealth portal, you will also be able to view your health information using other applications (apps) compatible with our system.

## 2023-05-23 NOTE — ED STATDOCS - CHPI ED TIMING
Pulmonary Function Testing      Patient name:  David Melton     [facility] Unit #:   [id number]   Date of test:  5/22/2023  Date of interpretation:   5/23/2023    Ms. David Melton is a 68y.o. year-old former smoker. The spirometry data were acceptable and reproducible. Spirometry:  Flow volume loops were obstructed. The FEV-1/FVC ratio was decreased. The FEV-1 was 1.12 liters (50% of predicted), which was severely decreased. The FVC was 1.79 liters (60% of predicted), which was decreased. Response to inhaled bronchodilators (albuterol) was not significant. Lung volumes:  Lung volumes were tested by plethysmography. The total lung capacity was 4.44 liters (89% of predicted), which was normal. The residual volume was 2.54 liters (124% of predicted), which was increased. The ratio of residual volume to total lung capacity (RV/TLC) was 57, which was increased. Diffusion capacity was found to be 75% which is Mildly decreased. Interpretation:  Severe obstructive airway disease with no significant bronchodilator reversibility.      Comments: last night

## 2024-12-09 NOTE — ED ADULT TRIAGE NOTE - PAIN: PRESENCE, MLM
Shingles vaccine is recommended. You can get this at a local pharmacy.  
complains of pain/discomfort

## 2024-12-30 ENCOUNTER — APPOINTMENT (OUTPATIENT)
Dept: INTERNAL MEDICINE | Facility: CLINIC | Age: 54
End: 2024-12-30
Payer: COMMERCIAL

## 2024-12-30 ENCOUNTER — LABORATORY RESULT (OUTPATIENT)
Age: 54
End: 2024-12-30

## 2024-12-30 ENCOUNTER — NON-APPOINTMENT (OUTPATIENT)
Age: 54
End: 2024-12-30

## 2024-12-30 VITALS
OXYGEN SATURATION: 96 % | TEMPERATURE: 97.6 F | HEIGHT: 71 IN | SYSTOLIC BLOOD PRESSURE: 150 MMHG | HEART RATE: 63 BPM | BODY MASS INDEX: 42 KG/M2 | DIASTOLIC BLOOD PRESSURE: 92 MMHG | RESPIRATION RATE: 15 BRPM | WEIGHT: 300 LBS

## 2024-12-30 VITALS — DIASTOLIC BLOOD PRESSURE: 90 MMHG | SYSTOLIC BLOOD PRESSURE: 154 MMHG

## 2024-12-30 DIAGNOSIS — N52.31 ERECTILE DYSFUNCTION FOLLOWING RADICAL PROSTATECTOMY: ICD-10-CM

## 2024-12-30 DIAGNOSIS — C61 MALIGNANT NEOPLASM OF PROSTATE: ICD-10-CM

## 2024-12-30 DIAGNOSIS — I10 ESSENTIAL (PRIMARY) HYPERTENSION: ICD-10-CM

## 2024-12-30 DIAGNOSIS — Z85.46 PERSONAL HISTORY OF MALIGNANT NEOPLASM OF PROSTATE: ICD-10-CM

## 2024-12-30 DIAGNOSIS — F17.200 NICOTINE DEPENDENCE, UNSPECIFIED, UNCOMPLICATED: ICD-10-CM

## 2024-12-30 DIAGNOSIS — Z00.00 ENCOUNTER FOR GENERAL ADULT MEDICAL EXAMINATION W/OUT ABNORMAL FINDINGS: ICD-10-CM

## 2024-12-30 PROCEDURE — 93000 ELECTROCARDIOGRAM COMPLETE: CPT

## 2024-12-30 PROCEDURE — 99386 PREV VISIT NEW AGE 40-64: CPT

## 2024-12-30 RX ORDER — TADALAFIL 10 MG/1
10 TABLET, FILM COATED ORAL
Qty: 30 | Refills: 5 | Status: ACTIVE | COMMUNITY
Start: 1900-01-01 | End: 1900-01-01

## 2024-12-30 RX ORDER — LOSARTAN POTASSIUM 50 MG/1
50 TABLET, FILM COATED ORAL
Qty: 30 | Refills: 3 | Status: ACTIVE | COMMUNITY
Start: 2024-12-30 | End: 1900-01-01

## 2025-01-01 LAB
ALBUMIN SERPL ELPH-MCNC: 4.3 G/DL
ALP BLD-CCNC: 84 U/L
ALT SERPL-CCNC: 15 U/L
ANION GAP SERPL CALC-SCNC: 12 MMOL/L
APPEARANCE: CLEAR
AST SERPL-CCNC: 12 U/L
BILIRUB SERPL-MCNC: 0.4 MG/DL
BILIRUBIN URINE: NEGATIVE
BLOOD URINE: NEGATIVE
BUN SERPL-MCNC: 21 MG/DL
CALCIUM SERPL-MCNC: 9.3 MG/DL
CHLORIDE SERPL-SCNC: 101 MMOL/L
CHOLEST SERPL-MCNC: 254 MG/DL
CO2 SERPL-SCNC: 25 MMOL/L
COLOR: YELLOW
CREAT SERPL-MCNC: 0.77 MG/DL
EGFR: 106 ML/MIN/1.73M2
ESTIMATED AVERAGE GLUCOSE: 117 MG/DL
FOLATE SERPL-MCNC: 6.9 NG/ML
GLUCOSE QUALITATIVE U: NEGATIVE MG/DL
GLUCOSE SERPL-MCNC: 88 MG/DL
HBA1C MFR BLD HPLC: 5.7 %
HCT VFR BLD CALC: 44.1 %
HDLC SERPL-MCNC: 60 MG/DL
HGB BLD-MCNC: 14.7 G/DL
KETONES URINE: NEGATIVE MG/DL
LDLC SERPL CALC-MCNC: 159 MG/DL
LEUKOCYTE ESTERASE URINE: ABNORMAL
MCHC RBC-ENTMCNC: 30 PG
MCHC RBC-ENTMCNC: 33.3 G/DL
MCV RBC AUTO: 90 FL
NITRITE URINE: NEGATIVE
NONHDLC SERPL-MCNC: 194 MG/DL
PH URINE: 6.5
PLATELET # BLD AUTO: 180 K/UL
POTASSIUM SERPL-SCNC: 4.6 MMOL/L
PROT SERPL-MCNC: 7.1 G/DL
PROTEIN URINE: NEGATIVE MG/DL
PSA SERPL-MCNC: <0.01 NG/ML
RBC # BLD: 4.9 M/UL
RBC # FLD: 14.2 %
SODIUM SERPL-SCNC: 138 MMOL/L
SPECIFIC GRAVITY URINE: 1.02
TRIGL SERPL-MCNC: 192 MG/DL
TSH SERPL-ACNC: 2.53 UIU/ML
UROBILINOGEN URINE: 0.2 MG/DL
VIT B12 SERPL-MCNC: 400 PG/ML
WBC # FLD AUTO: 6.93 K/UL

## 2025-01-03 DIAGNOSIS — E78.5 HYPERLIPIDEMIA, UNSPECIFIED: ICD-10-CM

## 2025-01-03 RX ORDER — ROSUVASTATIN CALCIUM 10 MG/1
10 TABLET, FILM COATED ORAL
Qty: 30 | Refills: 3 | Status: ACTIVE | COMMUNITY
Start: 2025-01-03 | End: 1900-01-01